# Patient Record
Sex: FEMALE | Race: WHITE | NOT HISPANIC OR LATINO | Employment: OTHER | URBAN - NONMETROPOLITAN AREA
[De-identification: names, ages, dates, MRNs, and addresses within clinical notes are randomized per-mention and may not be internally consistent; named-entity substitution may affect disease eponyms.]

---

## 2017-01-03 DIAGNOSIS — M1A.9XX1 CHRONIC GOUT WITH TOPHUS, UNSPECIFIED CAUSE, UNSPECIFIED SITE: Chronic | ICD-10-CM

## 2017-01-03 DIAGNOSIS — I10 ESSENTIAL HYPERTENSION: ICD-10-CM

## 2017-01-03 DIAGNOSIS — M10.9 GOUT, UNSPECIFIED CAUSE, UNSPECIFIED CHRONICITY, UNSPECIFIED SITE: ICD-10-CM

## 2017-01-03 DIAGNOSIS — E78.00 HYPERCHOLESTEROLEMIA: ICD-10-CM

## 2017-01-03 RX ORDER — LISINOPRIL AND HYDROCHLOROTHIAZIDE 25; 20 MG/1; MG/1
TABLET ORAL
Qty: 90 TAB | Refills: 0 | Status: SHIPPED | OUTPATIENT
Start: 2017-01-03 | End: 2017-03-22 | Stop reason: SDUPTHER

## 2017-01-03 RX ORDER — LOVASTATIN 10 MG/1
TABLET ORAL
Qty: 90 TAB | Refills: 0 | Status: SHIPPED | OUTPATIENT
Start: 2017-01-03 | End: 2017-03-22 | Stop reason: SDUPTHER

## 2017-03-08 ENCOUNTER — HOSPITAL ENCOUNTER (OUTPATIENT)
Dept: LAB | Facility: MEDICAL CENTER | Age: 67
End: 2017-03-08
Attending: INTERNAL MEDICINE
Payer: MEDICARE

## 2017-03-08 DIAGNOSIS — I10 ESSENTIAL HYPERTENSION: ICD-10-CM

## 2017-03-08 DIAGNOSIS — E55.9 VITAMIN D DEFICIENCY: ICD-10-CM

## 2017-03-08 LAB
25(OH)D3 SERPL-MCNC: 36 NG/ML (ref 30–100)
ALBUMIN SERPL BCP-MCNC: 3.6 G/DL (ref 3.2–4.9)
ALBUMIN/GLOB SERPL: 1.1 G/DL
ALP SERPL-CCNC: 100 U/L (ref 30–99)
ALT SERPL-CCNC: 15 U/L (ref 2–50)
ANION GAP SERPL CALC-SCNC: 11 MMOL/L (ref 0–11.9)
AST SERPL-CCNC: 21 U/L (ref 12–45)
BASOPHILS # BLD AUTO: 0.06 K/UL (ref 0–0.12)
BASOPHILS NFR BLD AUTO: 0.8 % (ref 0–1.8)
BILIRUB SERPL-MCNC: 0.6 MG/DL (ref 0.1–1.5)
BUN SERPL-MCNC: 29 MG/DL (ref 8–22)
CALCIUM SERPL-MCNC: 9.4 MG/DL (ref 8.5–10.5)
CHLORIDE SERPL-SCNC: 107 MMOL/L (ref 96–112)
CO2 SERPL-SCNC: 24 MMOL/L (ref 20–33)
CREAT SERPL-MCNC: 1.28 MG/DL (ref 0.5–1.4)
EOSINOPHIL # BLD: 0.46 K/UL (ref 0–0.51)
EOSINOPHIL NFR BLD AUTO: 6.5 % (ref 0–6.9)
ERYTHROCYTE [DISTWIDTH] IN BLOOD BY AUTOMATED COUNT: 45.8 FL (ref 35.9–50)
GLOBULIN SER CALC-MCNC: 3.2 G/DL (ref 1.9–3.5)
GLUCOSE SERPL-MCNC: 87 MG/DL (ref 65–99)
HCT VFR BLD AUTO: 41 % (ref 37–47)
HGB BLD-MCNC: 13.1 G/DL (ref 12–16)
IMM GRANULOCYTES # BLD AUTO: 0.02 K/UL (ref 0–0.11)
IMM GRANULOCYTES NFR BLD AUTO: 0.3 % (ref 0–0.9)
LYMPHOCYTES # BLD: 1.25 K/UL (ref 1–4.8)
LYMPHOCYTES NFR BLD AUTO: 17.6 % (ref 22–41)
MCH RBC QN AUTO: 28.4 PG (ref 27–33)
MCHC RBC AUTO-ENTMCNC: 32 G/DL (ref 33.6–35)
MCV RBC AUTO: 88.7 FL (ref 81.4–97.8)
MONOCYTES # BLD: 0.48 K/UL (ref 0–0.85)
MONOCYTES NFR BLD AUTO: 6.8 % (ref 0–13.4)
NEUTROPHILS # BLD: 4.84 K/UL (ref 2–7.15)
NEUTROPHILS NFR BLD AUTO: 68 % (ref 44–72)
NRBC # BLD AUTO: 0 K/UL
NRBC BLD-RTO: 0 /100 WBC
PLATELET # BLD AUTO: 216 K/UL (ref 164–446)
PMV BLD AUTO: 11.8 FL (ref 9–12.9)
POTASSIUM SERPL-SCNC: 3.9 MMOL/L (ref 3.6–5.5)
PROT SERPL-MCNC: 6.8 G/DL (ref 6–8.2)
RBC # BLD AUTO: 4.62 M/UL (ref 4.2–5.4)
SODIUM SERPL-SCNC: 142 MMOL/L (ref 135–145)
URATE SERPL-MCNC: 7.2 MG/DL (ref 1.9–8.2)
WBC # BLD AUTO: 7.1 K/UL (ref 4.8–10.8)

## 2017-03-08 PROCEDURE — 80053 COMPREHEN METABOLIC PANEL: CPT

## 2017-03-08 PROCEDURE — 36415 COLL VENOUS BLD VENIPUNCTURE: CPT

## 2017-03-08 PROCEDURE — 85025 COMPLETE CBC W/AUTO DIFF WBC: CPT

## 2017-03-08 PROCEDURE — 84550 ASSAY OF BLOOD/URIC ACID: CPT

## 2017-03-08 PROCEDURE — 82306 VITAMIN D 25 HYDROXY: CPT

## 2017-03-22 ENCOUNTER — OFFICE VISIT (OUTPATIENT)
Dept: MEDICAL GROUP | Facility: PHYSICIAN GROUP | Age: 67
End: 2017-03-22
Payer: MEDICARE

## 2017-03-22 VITALS
RESPIRATION RATE: 16 BRPM | DIASTOLIC BLOOD PRESSURE: 70 MMHG | SYSTOLIC BLOOD PRESSURE: 112 MMHG | HEIGHT: 60 IN | WEIGHT: 203 LBS | HEART RATE: 64 BPM | TEMPERATURE: 98.8 F | BODY MASS INDEX: 39.85 KG/M2 | OXYGEN SATURATION: 96 %

## 2017-03-22 DIAGNOSIS — L72.9 SCALP CYST: ICD-10-CM

## 2017-03-22 DIAGNOSIS — E78.00 HYPERCHOLESTEROLEMIA: ICD-10-CM

## 2017-03-22 DIAGNOSIS — R94.4 DECREASED GFR: ICD-10-CM

## 2017-03-22 DIAGNOSIS — I10 ESSENTIAL HYPERTENSION: ICD-10-CM

## 2017-03-22 DIAGNOSIS — M54.2 POSTERIOR NECK PAIN: ICD-10-CM

## 2017-03-22 DIAGNOSIS — M79.671 RIGHT FOOT PAIN: ICD-10-CM

## 2017-03-22 DIAGNOSIS — M10.9 GOUT, UNSPECIFIED CAUSE, UNSPECIFIED CHRONICITY, UNSPECIFIED SITE: ICD-10-CM

## 2017-03-22 PROCEDURE — 1101F PT FALLS ASSESS-DOCD LE1/YR: CPT | Performed by: NURSE PRACTITIONER

## 2017-03-22 PROCEDURE — G8432 DEP SCR NOT DOC, RNG: HCPCS | Performed by: NURSE PRACTITIONER

## 2017-03-22 PROCEDURE — 99214 OFFICE O/P EST MOD 30 MIN: CPT | Performed by: NURSE PRACTITIONER

## 2017-03-22 PROCEDURE — 1036F TOBACCO NON-USER: CPT | Performed by: NURSE PRACTITIONER

## 2017-03-22 PROCEDURE — 3014F SCREEN MAMMO DOC REV: CPT | Mod: 8P | Performed by: NURSE PRACTITIONER

## 2017-03-22 PROCEDURE — G8419 CALC BMI OUT NRM PARAM NOF/U: HCPCS | Performed by: NURSE PRACTITIONER

## 2017-03-22 PROCEDURE — G8484 FLU IMMUNIZE NO ADMIN: HCPCS | Performed by: NURSE PRACTITIONER

## 2017-03-22 PROCEDURE — 4040F PNEUMOC VAC/ADMIN/RCVD: CPT | Performed by: NURSE PRACTITIONER

## 2017-03-22 RX ORDER — ALLOPURINOL 100 MG/1
100 TABLET ORAL 2 TIMES DAILY
Qty: 180 TAB | Refills: 1 | Status: SHIPPED | OUTPATIENT
Start: 2017-03-22 | End: 2017-10-03 | Stop reason: SDUPTHER

## 2017-03-22 RX ORDER — LOVASTATIN 10 MG/1
10 TABLET ORAL DAILY
Qty: 90 TAB | Refills: 1 | Status: SHIPPED | OUTPATIENT
Start: 2017-03-22 | End: 2017-10-03 | Stop reason: SDUPTHER

## 2017-03-22 RX ORDER — LISINOPRIL AND HYDROCHLOROTHIAZIDE 25; 20 MG/1; MG/1
1 TABLET ORAL DAILY
Qty: 90 TAB | Refills: 1 | Status: SHIPPED | OUTPATIENT
Start: 2017-03-22 | End: 2017-10-03 | Stop reason: SDUPTHER

## 2017-03-22 ASSESSMENT — PATIENT HEALTH QUESTIONNAIRE - PHQ9: CLINICAL INTERPRETATION OF PHQ2 SCORE: 1

## 2017-03-22 NOTE — ASSESSMENT & PLAN NOTE
This is a chronic condition which is well controlled on medications. Patient is tolerating medications without side effects.

## 2017-03-22 NOTE — MR AVS SNAPSHOT
Divya Morales   3/22/2017 11:40 AM   Office Visit   MRN: [id number]    Department:  [facility]   Dept Phone:  [phone]    Description:  Female : 1950   Provider:  BERNICE Villela           Reason for Visit     Follow-Up labs      Allergies as of 3/22/2017     Allergen Noted Reactions    Percocet [Apap-Fd&C Red #40 [place]-Oxycodone] 09/15/2016   Vomiting    CHILLS, VOMITTING    Tramadol 2016   Vomiting      You were diagnosed with     Right foot pain   [723136]       Posterior neck pain   [014291]       Scalp cyst   [425850]       Essential hypertension   [3966326]       Hypercholesterolemia   [412495]       Gout, unspecified cause, unspecified chronicity, unspecified site   [4596691]       Decreased GFR   [902802]         Vital Signs     Blood Pressure Pulse Temperature Respirations Height Weight    112/70 mmHg 64 37.1 °C (98.8 °F) 16 1.524 m (5') 92.08 kg (203 lb)    Body Mass Index Oxygen Saturation Breastfeeding? Smoking Status          39.65 kg/m2 96% No Never Smoker         Basic Information     Date Of Birth Sex Race Ethnicity Preferred Language    1950 Female White Non- English      Your appointments     Sep 15, 2017 11:00 AM   Established Patient with Tabatha YOUSSEF M.D.   [facility] (--)    [address] [facility] [id number] [phone]           You will be receiving a confirmation call a few days before your appointment from our automated call confirmation system.              Problem List              ICD-10-CM Priority Class Noted - Resolved    Hypertension I10   Unknown - Present    Arthritis M19.90   Unknown - Present    Gout, chronic (Chronic) M1A.9XX0   2013 - Present    Hypercholesterolemia E78.00   8/15/2013 - Present    Decreased GFR R94.4   2013 - Present    Chronic pain of left knee M25.562, G89.29   2014 - Present    Obesity (BMI 35.0-39.9 without comorbidity) ([facility]) E66.9    2/25/2015 - Present    Dental caries K02.9   4/22/2015 - Present    CHF with left ventricular diastolic dysfunction, NYHA class 1 (CMS-[facility]) I50.30   6/14/2015 - Present    Localized primary osteoarthritis of left lower leg M17.12   3/14/2016 - Present    Localized primary osteoarthritis of lower leg M17.10   3/14/2016 - Present    Iron deficiency anemia due to chronic blood loss D50.0   6/7/2016 - Present    Obesity E66.9   6/7/2016 - Present    Scalp cyst L72.9   9/15/2016 - Present    Right foot pain M79.671   3/22/2017 - Present    Posterior neck pain M54.2   3/22/2017 - Present      Health Maintenance        Date Due Completion Dates    IMM DTaP/Tdap/Td Vaccine (1 - Tdap) 6/18/1967 ---    PAP SMEAR 6/18/1969 ---    IMM ZOSTER VACCINE 6/18/2008 ---    BONE DENSITY 6/18/2013 ---    MAMMOGRAM 9/4/2015 9/4/2014    IMM PNEUMOCOCCAL 65+ (ADULT) LOW/MEDIUM RISK SERIES (2 of 2 - PCV13) 5/21/2016 5/21/2015    IMM INFLUENZA (1) 9/1/2016 10/14/2015    COLONOSCOPY 8/29/2024 8/29/2014 (Declined)    Override on 8/29/2014: Patient Declined            Current Immunizations     Influenza Vaccine Adult HD 10/14/2015    Pneumococcal polysaccharide vaccine (PPSV-23) 5/21/2015      Below and/or attached are the medications your provider expects you to take. Review all of your home medications and newly ordered medications with your provider and/or pharmacist. Follow medication instructions as directed by your provider and/or pharmacist. Please keep your medication list with you and share with your provider. Update the information when medications are discontinued, doses are changed, or new medications (including over-the-counter products) are added; and carry medication information at all times in the event of emergency situations     Allergies:  PERCOCET - Vomiting     TRAMADOL - Vomiting               Medications  Valid as of: March 22, 2017 -  1:38 PM    Generic Name Brand Name Tablet Size Instructions for use    Allopurinol  (Tab) ZYLOPRIM 100 MG Take 1 Tab by mouth 2 times a day.        Aspirin (Tablet Delayed Response) ECOTRIN 81 MG Take 81 mg by mouth every day.        Cholecalciferol (Cap) Cholecalciferol 2000 UNIT Take 1 Cap by mouth every bedtime. For vitamin D deficiency        Ibuprofen (Tab) MOTRIN 800 MG Take 1 Tab by mouth 2 times a day as needed.        Lisinopril-Hydrochlorothiazide (Tab) PRINZIDE, ZESTORETIC 20-25 MG Take 1 Tab by mouth every day.        Lovastatin (Tab) MEVACOR 10 MG Take 1 Tab by mouth every day.        Multiple Vitamins-Minerals   Take  by mouth.        .                 Medicines prescribed today were sent to:     [facility] [address] [place] [address] - [address] [place] [address]    Phone: [phone] Fax: [phone]    Open 24 Hours?: No    [facility] [address] [facility]    [address] [facility] [address]    Phone: [phone] Fax: [phone]    Open 24 Hours?: No      Medication refill instructions:       If your prescription bottle indicates you have medication refills left, it is not necessary to call your provider’s office. Please contact your pharmacy and they will refill your medication.    If your prescription bottle indicates you do not have any refills left, you may request refills at any time through one of the following ways: The online [employer] system (except Urgent Care), by calling your provider’s office, or by asking your pharmacy to contact your provider’s office with a refill request. Medication refills are processed only during regular business hours and may not be available until the next business day. Your provider may request additional information or to have a follow-up visit with you prior to refilling your medication.   *Please Note: Medication refills are assigned a new Rx number when refilled electronically. Your pharmacy may indicate that no refills were authorized even though a new prescription for  the same medication is available at the pharmacy. Please request the medicine by name with the pharmacy before contacting your provider for a refill.           MyChart Access Code: Activation code not generated  Current MyChart Status: Active

## 2017-03-22 NOTE — ASSESSMENT & PLAN NOTE
Excision 2 months ago, healing well. Scabbed over, occasional clear drainage from the site. No redness, warmth to surgical site, no fevers.

## 2017-03-22 NOTE — ASSESSMENT & PLAN NOTE
"Patient reports posterior neck pain that is intermittent x 3+ months. She finds that the pain is worse when she looks down at her phone \"to play my games\" an activity that she does daily for extended periods of time, she does admit to being very sedentary. She has poor posture. She reports that she was told many years ago that she had a slipped disc, she did not have follow up. She denies radicular symptoms, pain in UE or loss of fine motor function. NSAID does help the pain.   "

## 2017-03-22 NOTE — ASSESSMENT & PLAN NOTE
Ongoing, stable. No change to GFR in 6 months. She continues to use NSAID 2-3 times a week. Cr, potassium, BUN are normal. HTN is well controlled on medication to include ACE-I. Push fluids. Patient is not diabetic. Monitor BNP Q 6 months.

## 2017-03-22 NOTE — ASSESSMENT & PLAN NOTE
Patient reports right 3rd toe pain that has been intermittent and ongoing for 1 month. She does find that walking with shoes on worsens the pain occasionally. NSAID relieves the pain. No erythema or TTTP. She continues to take allopurinol daily for gout, this doesn't feel like the pain she has had with gout flare in the past, her uric acid level was normal.

## 2017-03-22 NOTE — PROGRESS NOTES
"[facility]  Primary Care Office Visit - Problem-Oriented        History:     Divya Morales is a 66 y.o. female who is here today to discuss Follow-Up      Right foot pain  Patient reports right 3rd toe pain that has been intermittent and ongoing for 1 month. She does find that walking with shoes on worsens the pain occasionally. NSAID relieves the pain. No erythema or TTTP. She continues to take allopurinol daily for gout, this doesn't feel like the pain she has had with gout flare in the past, her uric acid level was normal.          Scalp cyst  Excision 2 months ago, healing well. Scabbed over, occasional clear drainage from the site. No redness, warmth to surgical site, no fevers.     Hypertension  This is a chronic condition which is well controlled on medications. Patient is tolerating medications without side effects.      Hypercholesterolemia  This is a chronic condition which is well controlled on medications. Patient is tolerating medications without side effects.      Decreased GFR  Ongoing, stable. No change to GFR in 6 months. She continues to use NSAID 2-3 times a week. Cr, potassium, BUN are normal. HTN is well controlled on medication to include ACE-I. Push fluids. Patient is not diabetic. Monitor BNP Q 6 months.     Posterior neck pain  Patient reports posterior neck pain that is intermittent x 3+ months. She finds that the pain is worse when she looks down at her phone \"to play my games\" an activity that she does daily for extended periods of time, she does admit to being very sedentary. She has poor posture. She reports that she was told many years ago that she had a slipped disc, she did not have follow up. She denies radicular symptoms, pain in UE or loss of fine motor function. NSAID does help the pain.           Past Medical History   Diagnosis Date   • Hypertension    • GOUT 7/16/2013   • Arthritis      back, knee   • Hypercholesterolemia 8/15/2013   • Decreased GFR " 9/25/2013   • Obesity (BMI 35.0-39.9 without comorbidity) 2/25/2015   • Cellulitis of hand 4/22/2015   • High cholesterol    • Pain      knees   • Cold 2/17/2016     stomach flu   • Congestive heart failure    • Dental disorder      missing teeth   • Iron deficiency anemia due to chronic blood loss 6/7/2016   • Obesity 6/7/2016   • Scalp cyst 9/15/2016     Past Surgical History   Procedure Laterality Date   • Tubal coagulation laparoscopic bilateral  1979   • Appendectomy  1979     with tubal ligation   • Tonsillectomy  1969   • Knee arthroplasty total Right 3/14/2016     Procedure: KNEE ARTHROPLASTY TOTAL;  Surgeon: Dain See M.D.;  [facility];  Service:      Social History     Social History   • Marital Status:      Spouse Name: N/A   • Number of Children: N/A   • Years of Education: N/A     Occupational History   • Not on file.     Social History Main Topics   • Smoking status: Never Smoker    • Smokeless tobacco: Never Used   • Alcohol Use: No      Comment: quit 12 yrs ago, before that drank heavily   • Drug Use: No   • Sexual Activity: Not Currently      Comment:      Other Topics Concern   • Not on file     Social History Narrative    , retired [employer]     History   Smoking status   • Never Smoker    Smokeless tobacco   • Never Used     Family History   Problem Relation Age of Onset   • Heart Disease Mother    • Heart Disease Father    • Diabetes Father    • Hypertension       Allergies   Allergen Reactions   • Percocet [Apap-Fd&C Red #40 [place]-Oxycodone] Vomiting     CHILLS, VOMITTING   • Tramadol Vomiting       Problem List:     Patient Active Problem List    Diagnosis Date Noted   • Right foot pain 03/22/2017   • Scalp cyst 09/15/2016   • Iron deficiency anemia due to chronic blood loss 06/07/2016   • Obesity 06/07/2016   • Localized primary osteoarthritis of left lower leg 03/14/2016   • Localized primary osteoarthritis of lower leg 03/14/2016   • CHF  with left ventricular diastolic dysfunction, NYHA class 1 (CMS-[facility]) 06/14/2015   • Dental caries 04/22/2015   • Obesity (BMI 35.0-39.9 without comorbidity) ([facility]) 02/25/2015   • Chronic pain of left knee 08/29/2014   • Decreased GFR 09/25/2013   • Hypercholesterolemia 08/15/2013   • Gout, chronic 07/16/2013   • Hypertension    • Arthritis          Medications:     Current outpatient prescriptions:   •  lisinopril-hydrochlorothiazide (PRINZIDE, ZESTORETIC) 20-25 MG per tablet, Take 1 Tab by mouth every day., Disp: 90 Tab, Rfl: 1  •  lovastatin (MEVACOR) 10 MG tablet, Take 1 Tab by mouth every day., Disp: 90 Tab, Rfl: 1  •  allopurinol (ZYLOPRIM) 100 MG Tab, Take 1 Tab by mouth 2 times a day., Disp: 180 Tab, Rfl: 1  •  Cholecalciferol ([employer] VITAMIN D) 2000 UNIT Cap, Take 1 Cap by mouth every bedtime. For vitamin D deficiency, Disp: 90 Cap, Rfl: 3  •  Multiple Vitamins-Minerals (MULTIVITAMIN PO), Take  by mouth., Disp: , Rfl:   •  aspirin EC (ECOTRIN) 81 MG TBEC, Take 81 mg by mouth every day., Disp: , Rfl:   •  ibuprofen (MOTRIN) 800 MG Tab, Take 1 Tab by mouth 2 times a day as needed., Disp: 60 Tab, Rfl: 1      Review of Systems:     Positives per HPI, all other systems reviewed and WNL       Physical Assessment:     VS: /70 mmHg  Pulse 64  Temp(Src) 37.1 °C (98.8 °F)  Resp 16  Ht 1.524 m (5')  Wt 92.08 kg (203 lb)  BMI 39.65 kg/m2  SpO2 96%  Breastfeeding? No    General: Well-developed, well-nourished female, obese body habitus     Head: PERRL, EOMI. Normocephalic. No facial asymmetry noted.  Neck: upper traps with increased tone, decreased ROM in all planes, no pain with ROM. Spurlings negative bilat. Sensation and strength equal BUE. Neck supple, no thyromegaly  Cardiovasc:No JVD.  RRR, no MRG. No thrills or bruits. Pulses 2+ and symmetric at all distal extremities.  Pulmonary: Lungs clear bilaterally.  Normal respiratory effort. No wheeze or crackles.   Extremities: No edema, no TTP bilateral calves.  Pedal pulses intact. No joint effusions. LEs warm and well-perfused. Right foot exam unremarkable.   Neuro: Alert and oriented  Skin: excision site with scab, no drainage or erythema, NTTP. No rashes noted. Skin warm, dry, intact    Psych: Dressed appropriately for the weather, pleasant and conversant.  Affect, mood & judgment appropriate.      Assessment/Plan:   [place] was seen today for follow-up.    Diagnoses and all orders for this visit:    Right foot pain, new   - reassurance, OA vs tendonitis, may continue to use NSAID sparingly, if pain persists or worsens will move forward with XRAY.     Posterior neck pain, new   - PE reassuring, cervical strain, discussed importance of good posture, ROM exercises, massage, cervical extension with towel or roll daily, raising items to eye height to avoid looking down for prolonged periods. NSAID sparingly. Follow up if sx worsen or persist, will move forward with XRAY.     Scalp cyst, excision site healing well     Essential hypertension, chronic, controlled   - continue current treatment, follow up in 6 months   -     lisinopril-hydrochlorothiazide (PRINZIDE, ZESTORETIC) 20-25 MG per tablet; Take 1 Tab by mouth every day.    Hypercholesterolemia, chronic, controlled   - continue current treatment, follow up in 6 months   -     lovastatin (MEVACOR) 10 MG tablet; Take 1 Tab by mouth every day.    Gout, unspecified cause, unspecified chronicity, unspecified site, chronic, controlled   - continue current treatment, follow up in 6 months   -     allopurinol (ZYLOPRIM) 100 MG Tab; Take 1 Tab by mouth 2 times a day.    Decreased GFR, stable   - discussed avoiding regular/daily use of NSAID, push fluids. BP well controlled on ACE-I. Monitor BMP q 6 months.     Patient is agreeable to the above plan and voiced understanding. All questions answered.     Please note that this dictation was created using voice recognition software. I have made every reasonable attempt to correct  obvious errors, but I expect that there are errors of grammar and possibly content that I did not discover before finalizing the note.      RUDY Ordoñez  3/22/2017, 1:22 PM

## 2017-06-13 ENCOUNTER — PATIENT OUTREACH (OUTPATIENT)
Dept: HEALTH INFORMATION MANAGEMENT | Facility: OTHER | Age: 67
End: 2017-06-13

## 2017-06-13 NOTE — PROGRESS NOTES
6/13/17  -  Outcome: Left Message with [facility] [employer]    WebIZ Checked & Epic Updated:  yes    [facility] Verified: mcr    Attempt # 1

## 2017-09-15 ENCOUNTER — OFFICE VISIT (OUTPATIENT)
Dept: MEDICAL GROUP | Facility: PHYSICIAN GROUP | Age: 67
End: 2017-09-15
Payer: MEDICARE

## 2017-09-15 VITALS
HEIGHT: 60 IN | DIASTOLIC BLOOD PRESSURE: 80 MMHG | WEIGHT: 210 LBS | HEART RATE: 66 BPM | OXYGEN SATURATION: 96 % | RESPIRATION RATE: 20 BRPM | BODY MASS INDEX: 41.23 KG/M2 | SYSTOLIC BLOOD PRESSURE: 136 MMHG | TEMPERATURE: 97.3 F

## 2017-09-15 DIAGNOSIS — K02.9 DENTAL CARIES: ICD-10-CM

## 2017-09-15 DIAGNOSIS — F34.1 DYSTHYMIA: ICD-10-CM

## 2017-09-15 DIAGNOSIS — Z23 NEED FOR VACCINATION WITH 13-POLYVALENT PNEUMOCOCCAL CONJUGATE VACCINE: ICD-10-CM

## 2017-09-15 DIAGNOSIS — Z23 NEED FOR IMMUNIZATION AGAINST INFLUENZA: ICD-10-CM

## 2017-09-15 DIAGNOSIS — E66.9 OBESITY (BMI 35.0-39.9 WITHOUT COMORBIDITY): ICD-10-CM

## 2017-09-15 DIAGNOSIS — M54.2 POSTERIOR NECK PAIN: ICD-10-CM

## 2017-09-15 DIAGNOSIS — I50.30 CHF WITH LEFT VENTRICULAR DIASTOLIC DYSFUNCTION, NYHA CLASS 1 (HCC): ICD-10-CM

## 2017-09-15 PROCEDURE — 90662 IIV NO PRSV INCREASED AG IM: CPT | Performed by: INTERNAL MEDICINE

## 2017-09-15 PROCEDURE — 90670 PCV13 VACCINE IM: CPT | Performed by: INTERNAL MEDICINE

## 2017-09-15 PROCEDURE — G0009 ADMIN PNEUMOCOCCAL VACCINE: HCPCS | Performed by: INTERNAL MEDICINE

## 2017-09-15 PROCEDURE — 99214 OFFICE O/P EST MOD 30 MIN: CPT | Mod: 25 | Performed by: INTERNAL MEDICINE

## 2017-09-15 PROCEDURE — G0008 ADMIN INFLUENZA VIRUS VAC: HCPCS | Performed by: INTERNAL MEDICINE

## 2017-09-15 RX ORDER — ESCITALOPRAM OXALATE 10 MG/1
10 TABLET ORAL DAILY
Qty: 30 TAB | Refills: 5 | Status: SHIPPED | OUTPATIENT
Start: 2017-09-15 | End: 2018-03-10 | Stop reason: SDUPTHER

## 2017-09-15 ASSESSMENT — PAIN SCALES - GENERAL: PAINLEVEL: 5=MODERATE PAIN

## 2017-09-15 NOTE — PROGRESS NOTES
Chief Complaint   Patient presents with   • Neck Pain     neck pain x3 months    • Other     dry spots on face, mole on left arm    • Immunizations     pneumo 13, TDAP       HISTORY OF PRESENT ILLNESS: Patient is a 67 y.o. female established patient who presents today to discuss the medical issues below.    Posterior neck pain  Patient continues to complain of the neck pain, she feels this is gradually getting worse. Patient takes some ibuprofen about 2 x a week. Helps a bit.  Not exercising.  pjatient states no radiation into th upper extremities.  Not doing any true neck or shoulder x rays.    Dental caries  Patient continues with dental caries.  She reports she had an abcess about 1 mo ago, antibiotics at the [place] [facility].  Not clear if the patient has medicaid or not.      CHF with left ventricular diastolic dysfunction, NYHA class 1 (CMS-HCC)  sestamibi stress test neg 2015.  Patient reports some chest tightness and sob no radiation or specific pain, she was under a lot of stress with tooth abscess.      Obesity (BMI 35.0-39.9 without comorbidity) ([facility])  Patient not exercising, weight is up.  She has lots of reasons for this.      Dysthymia  Patient tearfully today after discussion of problems and some lack of motivation.       Patient Active Problem List    Diagnosis Date Noted   • CHF with left ventricular diastolic dysfunction, NYHA class 1 (CMS-HCC) 06/14/2015     Priority: High   • Decreased GFR 09/25/2013     Priority: High   • Posterior neck pain 03/22/2017     Priority: Medium   • Obesity (BMI 35.0-39.9 without comorbidity) ([facility]) 02/25/2015     Priority: Medium   • Hypercholesterolemia 08/15/2013     Priority: Medium   • Hypertension      Priority: Medium   • Dysthymia 09/15/2017   • Right foot pain 03/22/2017   • Scalp cyst 09/15/2016   • Iron deficiency anemia due to chronic blood loss 06/07/2016   • Localized primary osteoarthritis of left lower leg 03/14/2016   • Localized primary osteoarthritis of  lower leg 2016   • Dental caries 2015   • Chronic pain of left knee 2014   • Gout, chronic 2013   • Arthritis        Allergies:Amoxicillin; Percocet [apap-fd&c red #40 [place]-oxycodone]; and Tramadol    Current Outpatient Prescriptions   Medication Sig Dispense Refill   • escitalopram (LEXAPRO) 10 MG Tab Take 1 Tab by mouth every day. 30 Tab 5   • lisinopril-hydrochlorothiazide (PRINZIDE, ZESTORETIC) 20-25 MG per tablet Take 1 Tab by mouth every day. 90 Tab 1   • lovastatin (MEVACOR) 10 MG tablet Take 1 Tab by mouth every day. 90 Tab 1   • allopurinol (ZYLOPRIM) 100 MG Tab Take 1 Tab by mouth 2 times a day. 180 Tab 1   • Cholecalciferol ([employer]S VITAMIN D) 2000 UNIT Cap Take 1 Cap by mouth every bedtime. For vitamin D deficiency 90 Cap 3   • Multiple Vitamins-Minerals (MULTIVITAMIN PO) Take  by mouth.     • aspirin EC (ECOTRIN) 81 MG TBEC Take 81 mg by mouth every day.     • ibuprofen (MOTRIN) 800 MG Tab Take 1 Tab by mouth 2 times a day as needed. 60 Tab 1     No current facility-administered medications for this visit.          Past Medical History:   Diagnosis Date   • Scalp cyst 9/15/2016   • Iron deficiency anemia due to chronic blood loss 2016   • Obesity 2016   • Cold 2016    stomach flu   • Cellulitis of hand 2015   • Obesity (BMI 35.0-39.9 without comorbidity) ([facility]) 2015   • Decreased GFR 2013   • Hypercholesterolemia 8/15/2013   • GOUT 2013   • Arthritis     back, knee   • Congestive heart failure (CMS-[facility])    • Dental disorder     missing teeth   • High cholesterol    • Hypertension    • Pain     knees       Social History   Substance Use Topics   • Smoking status: Never Smoker   • Smokeless tobacco: Never Used   • Alcohol use No      Comment: quit 12 yrs ago, before that drank heavily       Family Status   Relation Status   • Mother Alive   • Father    •       Family History   Problem Relation Age of Onset   • Heart Disease Mother    • Heart  Disease Father    • Diabetes Father    • Hypertension         ROS:    Respiratory: Negative for cough, sputum production, shortness of breath or wheezing.    Cardiovascular: Negative for chest pain, palpitations, orthopnea, dyspnea with exertion or edema.   Gastrointestinal: Negative for GI upset, nausea, vomiting, abdominal pain, constipation or diarrhea.   Genitourinary: Negative for dysuria, urgency, hesitancy or frequency.       Exam:    Blood pressure 136/80, pulse 66, temperature 36.3 °C (97.3 °F), resp. rate 20, height 1.524 m (5'), weight 95.3 kg (210 lb), SpO2 96 %.  General:  Well nourished, well developed female in NAD.  HEENT: Teeth in very poor repair  Skin: Diffuse hyperkeratosis  Pulmonary: Clear to ausculation and percussion.  Normal effort. No rales, rhonchi, or wheezing.  Cardiovascular: Regular rate and rhythm without murmur.   Abdomen: Normal bowel sounds soft and nontender no palpable liver spleen bladder mass.  Extremities: No LE edema noted.  Neuro: Grossly nonfocal.  Psych: Alert and oriented to person, place, and time. Initially very  jovial subsequently tearful        This dictation was created using voice recognition software. I have made reasonable attempts to correct errors, however, errors of grammar and content may exist.          Assessment/Plan:    1. Posterior neck pain  Patient's daughter is here with her today which gives us an opportunity to review previous recommendations for neck pain management and lack of progression. At this point daughter feels that chiropractic would be the most appropriate. Back and neck exercises handout given. Recommendation for x-ray, early follow-up if not progressing would need referral  - DX-CERVICAL SPINE-2 OR 3 VIEWS; Future    2. Dental caries  Reviewed with daughter and patient recommendation to have the significant degree of caries addressed. Daughter is not certain whether patient has Medicaid or not. Discussed option for Medicaid dentistry in  [place] however need to progresses emphasized.    3. CHF with left ventricular diastolic dysfunction, NYHA class 1 (CMS-[facility])  Clinically compensated continuing on medications    4. Need for immunization against influenza    - INFLUENZA VACCINE, HIGH DOSE (65+ ONLY)    5. Need for vaccination with 13-polyvalent pneumococcal conjugate vaccine    - PNEUMOCOCCAL CONJUGATE VACCINE 13-VALENT    6. Obesity (BMI 35.0-39.9 without comorbidity) ([facility])  Reviewed diet and exercise weight loss. She somewhat limited in her exercise program. Limited insight in diet. Daughter is encouraged to assist support monitor    7. Dysthymia  Patient initially joking and laughing however with directly addressing my concern about her lack of progression through therapy x-rays etc. she does become tearful with metastases to being somewhat overwhelmed. Long discussion held with the daughter and the patient. At this point increased family support and start low-dose antidepressant is Lexapro 10 mg by mouth daily.    Patient was seen for  25 minutes face to face of which more than 50% of the time was spent in counseling and coordination of care regarding the above problems.

## 2017-10-03 DIAGNOSIS — M10.9 GOUT, UNSPECIFIED CAUSE, UNSPECIFIED CHRONICITY, UNSPECIFIED SITE: ICD-10-CM

## 2017-10-03 DIAGNOSIS — I10 ESSENTIAL HYPERTENSION: ICD-10-CM

## 2017-10-03 DIAGNOSIS — E78.00 HYPERCHOLESTEROLEMIA: ICD-10-CM

## 2017-10-05 RX ORDER — ALLOPURINOL 100 MG/1
TABLET ORAL
Qty: 180 TAB | Refills: 0 | Status: SHIPPED | OUTPATIENT
Start: 2017-10-05 | End: 2018-01-16 | Stop reason: SDUPTHER

## 2017-10-05 RX ORDER — LISINOPRIL AND HYDROCHLOROTHIAZIDE 25; 20 MG/1; MG/1
TABLET ORAL
Qty: 90 TAB | Refills: 0 | Status: SHIPPED | OUTPATIENT
Start: 2017-10-05 | End: 2018-01-16 | Stop reason: SDUPTHER

## 2017-10-05 RX ORDER — LOVASTATIN 10 MG/1
TABLET ORAL
Qty: 90 TAB | Refills: 0 | Status: SHIPPED | OUTPATIENT
Start: 2017-10-05 | End: 2018-01-16 | Stop reason: SDUPTHER

## 2017-10-05 NOTE — TELEPHONE ENCOUNTER
Was the patient seen in the last year in this department? Yes     Does patient have an active prescription for medications requested? No     Received Request Via: Pharmacy      Pt met protocol?: No pt last ov 9/2017  Is due for lipid labs  BP Readings from Last 1 Encounters:   09/15/17 136/80     Uric Acid   Date Value Ref Range Status   03/08/2017 7.2 1.9 - 8.2 mg/dL Final     Cholesterol,Tot   Date Value Ref Range Status   09/13/2016 167 100 - 199 mg/dL Final     LDL   Date Value Ref Range Status   09/13/2016 97 <100 mg/dL Final     HDL   Date Value Ref Range Status   09/13/2016 46 >=40 mg/dL Final

## 2017-11-10 ENCOUNTER — APPOINTMENT (OUTPATIENT)
Dept: RADIOLOGY | Facility: IMAGING CENTER | Age: 67
End: 2017-11-10
Attending: INTERNAL MEDICINE
Payer: MEDICARE

## 2017-11-10 ENCOUNTER — APPOINTMENT (OUTPATIENT)
Dept: URGENT CARE | Facility: PHYSICIAN GROUP | Age: 67
End: 2017-11-10
Payer: MEDICARE

## 2017-11-10 DIAGNOSIS — M54.2 POSTERIOR NECK PAIN: ICD-10-CM

## 2017-11-10 PROCEDURE — 72040 X-RAY EXAM NECK SPINE 2-3 VW: CPT | Mod: TC | Performed by: FAMILY MEDICINE

## 2017-11-22 ENCOUNTER — OFFICE VISIT (OUTPATIENT)
Dept: MEDICAL GROUP | Facility: PHYSICIAN GROUP | Age: 67
End: 2017-11-22
Payer: MEDICARE

## 2017-11-22 VITALS
RESPIRATION RATE: 16 BRPM | HEIGHT: 60 IN | SYSTOLIC BLOOD PRESSURE: 110 MMHG | OXYGEN SATURATION: 95 % | DIASTOLIC BLOOD PRESSURE: 68 MMHG | HEART RATE: 62 BPM | WEIGHT: 204 LBS | BODY MASS INDEX: 40.05 KG/M2 | TEMPERATURE: 97.1 F

## 2017-11-22 DIAGNOSIS — F34.1 DYSTHYMIA: ICD-10-CM

## 2017-11-22 DIAGNOSIS — E66.9 OBESITY (BMI 35.0-39.9 WITHOUT COMORBIDITY): ICD-10-CM

## 2017-11-22 DIAGNOSIS — M54.2 POSTERIOR NECK PAIN: ICD-10-CM

## 2017-11-22 DIAGNOSIS — K02.9 DENTAL CARIES: ICD-10-CM

## 2017-11-22 PROCEDURE — 99214 OFFICE O/P EST MOD 30 MIN: CPT | Performed by: INTERNAL MEDICINE

## 2017-11-22 ASSESSMENT — PAIN SCALES - GENERAL: PAINLEVEL: 5=MODERATE PAIN

## 2017-11-22 NOTE — ASSESSMENT & PLAN NOTE
patient had xray done.  States she did not go for the physical therapy, she states still a problem.  She is using the ibuprofen only [prn and states about 1-2 x a week.  Daughter is not here for OV.  Pain described as aching present most of the time, seems a bit worse.  She states sometimes radiates up into her skull.  No UE numbness tingling or weakness.  She feels more stiff and turns her body instead of the neck.  She states she did not go to the chiropractor and is unable to tell me why.

## 2017-11-22 NOTE — PATIENT INSTRUCTIONS
Consider referral to the neck specialist  Chiropractic is an option to try 1 x a mo when coming to town for shopping    Consider Ibuprofen 1 at evening with supper if neck hurting to sleep    Heat, stretching exercise.    Consider trial naproxen instead of motrin.

## 2017-11-22 NOTE — PROGRESS NOTES
Chief Complaint   Patient presents with   • Neck Pain     x ray results        HISTORY OF PRESENT ILLNESS: Patient is a 67 y.o. female established patient who presents today to discuss the medical issues below.    Posterior neck pain  patient had xray done.  States she did not go for the physical therapy, she states still a problem.  She is using the ibuprofen only [prn and states about 1-2 x a week.  Daughter is not here for OV.  Pain described as aching present most of the time, seems a bit worse.  She states sometimes radiates up into her skull.  No UE numbness tingling or weakness.  She feels more stiff and turns her body instead of the neck.  She states she did not go to the chiropractor and is unable to tell me why.      Dysthymia  Patient reports an episode of dehydration last week, felt weak, fell at home with position change, was seen at [facility], we do not have records, per patient dx as dehydration.  She admits to having not done anything previously discussed other than babysitting.    Dental caries  Financially limited, no progress on this issue.      Obesity (BMI 35.0-39.9 without comorbidity) ([facility])  Weight is down a bit but she doesn't identify as having changed her diet.        Patient Active Problem List    Diagnosis Date Noted   • CHF with left ventricular diastolic dysfunction, NYHA class 1 (CMS-[facility]) 06/14/2015     Priority: High   • Decreased GFR 09/25/2013     Priority: High   • Posterior neck pain 03/22/2017     Priority: Medium   • Obesity (BMI 35.0-39.9 without comorbidity) ([facility]) 02/25/2015     Priority: Medium   • Hypercholesterolemia 08/15/2013     Priority: Medium   • Hypertension      Priority: Medium   • Dysthymia 09/15/2017   • Right foot pain 03/22/2017   • Scalp cyst 09/15/2016   • Iron deficiency anemia due to chronic blood loss 06/07/2016   • Localized primary osteoarthritis of left lower leg 03/14/2016   • Localized primary osteoarthritis of lower leg 03/14/2016   • Dental caries  2015   • Chronic pain of left knee 2014   • Gout, chronic 2013   • Arthritis        Allergies:Amoxicillin; Percocet [apap-fd&c red #40 [place]-oxycodone]; and Tramadol    Current Outpatient Prescriptions   Medication Sig Dispense Refill   • lovastatin (MEVACOR) 10 MG tablet TAKE ONE TABLET BY MOUTH ONCE DAILY 90 Tab 0   • allopurinol (ZYLOPRIM) 100 MG Tab TAKE ONE TABLET BY MOUTH TWICE DAILY 180 Tab 0   • lisinopril-hydrochlorothiazide (PRINZIDE, ZESTORETIC) 20-25 MG per tablet TAKE ONE TABLET BY MOUTH ONCE DAILY 90 Tab 0   • escitalopram (LEXAPRO) 10 MG Tab Take 1 Tab by mouth every day. 30 Tab 5   • Cholecalciferol ([employer] VITAMIN D) 2000 UNIT Cap Take 1 Cap by mouth every bedtime. For vitamin D deficiency 90 Cap 3   • Multiple Vitamins-Minerals (MULTIVITAMIN PO) Take  by mouth.     • aspirin EC (ECOTRIN) 81 MG TBEC Take 81 mg by mouth every day.     • ibuprofen (MOTRIN) 800 MG Tab Take 1 Tab by mouth 2 times a day as needed. 60 Tab 1     No current facility-administered medications for this visit.          Past Medical History:   Diagnosis Date   • Arthritis     back, knee   • Cellulitis of hand 2015   • Cold 2016    stomach flu   • Congestive heart failure (CMS-[facility])    • Decreased GFR 2013   • Dental disorder     missing teeth   • GOUT 2013   • High cholesterol    • Hypercholesterolemia 8/15/2013   • Hypertension    • Iron deficiency anemia due to chronic blood loss 2016   • Obesity 2016   • Obesity (BMI 35.0-39.9 without comorbidity) 2015   • Pain     knees   • Scalp cyst 9/15/2016       Social History   Substance Use Topics   • Smoking status: Never Smoker   • Smokeless tobacco: Never Used   • Alcohol use No      Comment: quit 12 yrs ago, before that drank heavily       Family Status   Relation Status   • Mother Alive   • Father    •       Family History   Problem Relation Age of Onset   • Heart Disease Mother    • Heart Disease Father    • Diabetes  Father    • Hypertension         ROS:    Respiratory: Negative for cough, sputum production, shortness of breath or wheezing.    Cardiovascular: Negative for chest pain, palpitations, orthopnea, dyspnea with exertion or edema.   Gastrointestinal: Negative for GI upset, nausea, vomiting, abdominal pain, constipation or diarrhea.   Genitourinary: Negative for dysuria, urgency, hesitancy or frequency.       Exam:    Blood pressure 110/68, pulse 62, temperature 36.2 °C (97.1 °F), resp. rate 16, height 1.524 m (5'), weight 92.5 kg (204 lb), SpO2 95 %.  General:  Well nourished, well developed female in NAD.  Spine: Diffuse tenderness at the cervical spine no true point tenderness.  Pulmonary: Clear to ausculation and percussion.  Normal effort. No rales, rhonchi, or wheezing.  Cardiovascular: Regular rate and rhythm without murmur.   Abdomen: Normal bowel sounds soft and nontender no palpable liver spleen bladder mass.  Extremities bilateral upper extremity was no weakness, normal DTRs  Neuro: Grossly nonfocal.  Psych: Alert and oriented to person, place, and time. Appropriate mood and conversation.    X-ray reviewed    This dictation was created using voice recognition software. I have made reasonable attempts to correct errors, however, errors of grammar and content may exist.          Assessment/Plan:    1. Posterior neck pain  Discussed with patient the findings on the x-ray options for therapy. She is not undertaken regular anti-inflammatory therapy, chiropractic therapy or following the back exercise books. Offered again long discussion regarding maximizing therapy with conservative management more regular anti-inflammatory therapy. Back exercises, referral to physical therapy instead of chiropractic as she thinks that might be more appropriate. Referral made.   - REFERRAL TO PHYSICAL THERAPY Reason for Therapy: Eval/Treat/Report    2. Dysthymia  Patient with minimal insight into why she might not have done any of  the recommendations from the last office visit. Her Mini-Mental Status does not show any significant deficit. Continue ongoing monitoring.    3. Dental caries  At baseline.    4. Obesity (BMI 35.0-39.9 without comorbidity) (HCC)  No discussion on obesity issue is patient with poor compliance and insight.    Patient was seen for  25 minutes face to face of which more than 50% of the time was spent in counseling and coordination of care regarding the above problems.

## 2017-11-22 NOTE — PROGRESS NOTES
"MMSE    -What is the:  Year, season, date, day, month.               5/5 points    -Where are we:   State, county, town, hospital, floor.      5/5 points    -3 objects immediate recall.                                             3/3 points    -World backwards or serial sevens.                                 5/5 points    -Three-minute recall.                                                         3/3 points    -Name 2 objects.                                                               2/2 points    -Repeat the following.      \"No ifs and is or buts\"                                                    1/1 point    -Follow a 3 stage command.      Paper right hand, fold in half, place on floor.                 3/3 points    -Follow a written command.       Close your eyes                                                          1/1 point    -Write a complete sentence.                                           1/1 point    -Copy a design.                                                                1/1 point    Total                                                                                  30/30    Cutoff of 24 indicating dementia.          "

## 2017-11-22 NOTE — ASSESSMENT & PLAN NOTE
Patient reports an episode of dehydration last week, felt weak, fell at home with position change, was seen at [facility], we do not have records, per patient dx as dehydration.  She admits to having not done anything previously discussed other than babysitting.

## 2018-01-16 DIAGNOSIS — E78.00 HYPERCHOLESTEROLEMIA: ICD-10-CM

## 2018-01-16 DIAGNOSIS — I10 ESSENTIAL HYPERTENSION: ICD-10-CM

## 2018-01-16 DIAGNOSIS — M10.9 GOUT, UNSPECIFIED CAUSE, UNSPECIFIED CHRONICITY, UNSPECIFIED SITE: ICD-10-CM

## 2018-01-17 RX ORDER — ALLOPURINOL 100 MG/1
TABLET ORAL
Qty: 180 TAB | Refills: 1 | Status: SHIPPED | OUTPATIENT
Start: 2018-01-17 | End: 2018-03-21 | Stop reason: SDUPTHER

## 2018-01-17 RX ORDER — LISINOPRIL AND HYDROCHLOROTHIAZIDE 25; 20 MG/1; MG/1
TABLET ORAL
Qty: 90 TAB | Refills: 1 | Status: SHIPPED | OUTPATIENT
Start: 2018-01-17 | End: 2018-03-21 | Stop reason: SDUPTHER

## 2018-01-17 RX ORDER — LOVASTATIN 10 MG/1
TABLET ORAL
Qty: 90 TAB | Refills: 1 | Status: SHIPPED | OUTPATIENT
Start: 2018-01-17 | End: 2018-03-21 | Stop reason: SDUPTHER

## 2018-01-17 NOTE — TELEPHONE ENCOUNTER
*PT NEEDS TO UPDATE LABS*  Was the patient seen in the last year in this department? Yes     Does patient have an active prescription for medications requested? No     Received Request Via: Pharmacy      Pt met protocol?: No     LAST OV 11/22/2017    BP Readings from Last 1 Encounters:   11/22/17 110/68     Lab Results   Component Value Date/Time    CHOLSTRLTOT 167 09/13/2016 10:04 AM    LDL 97 09/13/2016 10:04 AM    HDL 46 09/13/2016 10:04 AM    TRIGLYCERIDE 119 09/13/2016 10:04 AM       Lab Results   Component Value Date/Time    SODIUM 142 03/08/2017 07:57 AM    POTASSIUM 3.9 03/08/2017 07:57 AM    CHLORIDE 107 03/08/2017 07:57 AM    CO2 24 03/08/2017 07:57 AM    GLUCOSE 87 03/08/2017 07:57 AM    BUN 29 (H) 03/08/2017 07:57 AM    CREATININE 1.28 03/08/2017 07:57 AM     Lab Results   Component Value Date/Time    ALKPHOSPHAT 100 (H) 03/08/2017 07:57 AM    ASTSGOT 21 03/08/2017 07:57 AM    ALTSGPT 15 03/08/2017 07:57 AM    TBILIRUBIN 0.6 03/08/2017 07:57 AM        Lab Results  Component Value Date/Time   URICACID 7.2 03/08/2017 0757     No results found for: HBA1C  No results found for: AVGLUC    Lab Results  Component Value Date/Time   CHOLSTRLTOT 167 09/13/2016 1004       Lab Results  Component Value Date/Time   TRIGLYCERIDE 119 09/13/2016 1004       Lab Results  Component Value Date/Time   HDL 46 09/13/2016 1004       Lab Results  Component Value Date/Time   LDL 97 09/13/2016 1004

## 2018-01-17 NOTE — TELEPHONE ENCOUNTER
Refill X 6 months, sent to pharmacy.Pt. Seen in the last 6 months per protocol.   Lab Results   Component Value Date/Time    SODIUM 142 03/08/2017 07:57 AM    POTASSIUM 3.9 03/08/2017 07:57 AM    CHLORIDE 107 03/08/2017 07:57 AM    CO2 24 03/08/2017 07:57 AM    GLUCOSE 87 03/08/2017 07:57 AM    BUN 29 (H) 03/08/2017 07:57 AM    CREATININE 1.28 03/08/2017 07:57 AM

## 2018-02-14 ENCOUNTER — HOSPITAL ENCOUNTER (OUTPATIENT)
Dept: LAB | Facility: MEDICAL CENTER | Age: 68
End: 2018-02-14
Attending: NURSE PRACTITIONER
Payer: MEDICARE

## 2018-02-14 DIAGNOSIS — E78.00 HYPERCHOLESTEROLEMIA: ICD-10-CM

## 2018-02-14 LAB
CHOLEST SERPL-MCNC: 132 MG/DL (ref 100–199)
HDLC SERPL-MCNC: 37 MG/DL
LDLC SERPL CALC-MCNC: 72 MG/DL
TRIGL SERPL-MCNC: 116 MG/DL (ref 0–149)

## 2018-02-14 PROCEDURE — 36415 COLL VENOUS BLD VENIPUNCTURE: CPT

## 2018-02-14 PROCEDURE — 80061 LIPID PANEL: CPT

## 2018-02-16 ENCOUNTER — HOSPITAL ENCOUNTER (OUTPATIENT)
Dept: LAB | Facility: MEDICAL CENTER | Age: 68
End: 2018-02-16
Attending: PHYSICIAN ASSISTANT
Payer: MEDICARE

## 2018-02-16 ENCOUNTER — OFFICE VISIT (OUTPATIENT)
Dept: URGENT CARE | Facility: PHYSICIAN GROUP | Age: 68
End: 2018-02-16
Payer: MEDICARE

## 2018-02-16 VITALS
WEIGHT: 203 LBS | BODY MASS INDEX: 39.85 KG/M2 | SYSTOLIC BLOOD PRESSURE: 118 MMHG | HEIGHT: 60 IN | DIASTOLIC BLOOD PRESSURE: 64 MMHG | OXYGEN SATURATION: 95 % | RESPIRATION RATE: 16 BRPM | HEART RATE: 60 BPM | TEMPERATURE: 97.1 F

## 2018-02-16 DIAGNOSIS — M54.2 CHRONIC NECK PAIN: ICD-10-CM

## 2018-02-16 DIAGNOSIS — E66.9 OBESITY (BMI 30-39.9): ICD-10-CM

## 2018-02-16 DIAGNOSIS — G89.29 CHRONIC NECK PAIN: ICD-10-CM

## 2018-02-16 DIAGNOSIS — M10.9 GOUT, UNSPECIFIED CAUSE, UNSPECIFIED CHRONICITY, UNSPECIFIED SITE: Primary | ICD-10-CM

## 2018-02-16 LAB — URATE SERPL-MCNC: 6.8 MG/DL (ref 1.9–8.2)

## 2018-02-16 PROCEDURE — 99214 OFFICE O/P EST MOD 30 MIN: CPT | Performed by: PHYSICIAN ASSISTANT

## 2018-02-16 PROCEDURE — 84550 ASSAY OF BLOOD/URIC ACID: CPT

## 2018-02-16 PROCEDURE — 36415 COLL VENOUS BLD VENIPUNCTURE: CPT

## 2018-02-16 RX ORDER — DICLOFENAC SODIUM 75 MG/1
75 TABLET, DELAYED RELEASE ORAL 2 TIMES DAILY
Qty: 30 TAB | Refills: 0 | Status: SHIPPED | OUTPATIENT
Start: 2018-02-16 | End: 2018-03-21

## 2018-02-16 NOTE — PROGRESS NOTES
Chief Complaint   Patient presents with   • Gout     x1wk R foot       HISTORY OF PRESENT ILLNESS: Patient is a 67 y.o. female who presents today because she has 2 issues to discuss. She had an appointment with her primary care provider today, but missed it because she had the wrong time.    #1 . She has a history of gout. She wants no further gout is flaring up. She takes allopurinol 100 mg twice a day. She has some shooting pain on the top of her right foot. This going on for several days but states that has resolved a little bit today. She is interested in getting her uric acid levels checked. She had uric acid levels checked about 10 months ago, it was 7.2. She has been taking some ibuprofen for this symptom, which has been helping to some degree. She also takes it for complaint #2.    Complaint #2. She has chronic posterior neck pain. She denies any radiating upper extremity pain or paresthesias. This pain has been chronic for months to years. Typically it does improve with some ibuprofen, but has not been improving with ibuprofen recently    Patient Active Problem List    Diagnosis Date Noted   • CHF with left ventricular diastolic dysfunction, NYHA class 1 (CMS-[facility]) 06/14/2015     Priority: High   • Decreased GFR 09/25/2013     Priority: High   • Posterior neck pain 03/22/2017     Priority: Medium   • Obesity (BMI 35.0-39.9 without comorbidity) ([facility]) 02/25/2015     Priority: Medium   • Hypercholesterolemia 08/15/2013     Priority: Medium   • Hypertension      Priority: Medium   • Obesity (BMI 30-39.9) 02/16/2018   • Dysthymia 09/15/2017   • Right foot pain 03/22/2017   • Scalp cyst 09/15/2016   • Iron deficiency anemia due to chronic blood loss 06/07/2016   • Localized primary osteoarthritis of left lower leg 03/14/2016   • Localized primary osteoarthritis of lower leg 03/14/2016   • Dental caries 04/22/2015   • Chronic pain of left knee 08/29/2014   • Gout, chronic 07/16/2013   • Arthritis         Allergies:Amoxicillin; Percocet [apap-fd&c red #40 [place]-oxycodone]; and Tramadol    Current Outpatient Prescriptions Ordered in [facility]   Medication Sig Dispense Refill   • diclofenac EC (VOLTAREN) 75 MG Tablet Delayed Response Take 1 Tab by mouth 2 times a day. 30 Tab 0   • lovastatin (MEVACOR) 10 MG tablet TAKE ONE TABLET BY MOUTH ONCE DAILY 90 Tab 1   • allopurinol (ZYLOPRIM) 100 MG Tab TAKE ONE TABLET BY MOUTH TWICE DAILY 180 Tab 1   • lisinopril-hydrochlorothiazide (PRINZIDE, ZESTORETIC) 20-25 MG per tablet TAKE ONE TABLET BY MOUTH ONCE DAILY 90 Tab 1   • escitalopram (LEXAPRO) 10 MG Tab Take 1 Tab by mouth every day. 30 Tab 5   • Cholecalciferol ([employer]S VITAMIN D) 2000 UNIT Cap Take 1 Cap by mouth every bedtime. For vitamin D deficiency 90 Cap 3   • ibuprofen (MOTRIN) 800 MG Tab Take 1 Tab by mouth 2 times a day as needed. 60 Tab 1   • Multiple Vitamins-Minerals (MULTIVITAMIN PO) Take  by mouth.     • aspirin EC (ECOTRIN) 81 MG TBEC Take 81 mg by mouth every day.       No current [facility]-ordered facility-administered medications on file.        Past Medical History:   Diagnosis Date   • Arthritis     back, knee   • Cellulitis of hand 2015   • Cold 2016    stomach flu   • Congestive heart failure (CMS-HCC)    • Decreased GFR 2013   • Dental disorder     missing teeth   • GOUT 2013   • High cholesterol    • Hypercholesterolemia 8/15/2013   • Hypertension    • Iron deficiency anemia due to chronic blood loss 2016   • Obesity 2016   • Obesity (BMI 35.0-39.9 without comorbidity) 2015   • Pain     knees   • Scalp cyst 9/15/2016       Social History   Substance Use Topics   • Smoking status: Never Smoker   • Smokeless tobacco: Never Used   • Alcohol use No      Comment: quit 12 yrs ago, before that drank heavily       Family Status   Relation Status   • Mother Alive   • Father    •       Family History   Problem Relation Age of Onset   • Heart Disease Mother    • Heart Disease  Father    • Diabetes Father    • Hypertension         ROS:  Review of Systems   Constitutional: Negative for fever, chills, weight loss and malaise/fatigue.   HENT: Negative for ear pain, nosebleeds, congestion, sore throat and positive for Musculoskeletal neck pain.    Eyes: Negative for blurred vision.   Respiratory: Negative for cough, sputum production, shortness of breath and wheezing.    Cardiovascular: Negative for chest pain, palpitations, orthopnea and leg swelling.   Gastrointestinal: Negative for heartburn, nausea, vomiting and abdominal pain.   Genitourinary: Negative for dysuria, urgency and frequency.     Exam:  Blood pressure 118/64, pulse 60, temperature 36.2 °C (97.1 °F), resp. rate 16, height 1.524 m (5'), weight 92.1 kg (203 lb), SpO2 95 %, not currently breastfeeding.  General:  Well nourished, well developed female in NAD  Head:Normocephalic, atraumatic  Eyes: PERRLA, EOM within normal limits, no conjunctival injection, no scleral icterus, visual fields and acuity grossly intact.  Nose: Symmetrical without tenderness, no discharge.  Mouth: reasonable hygiene, no erythema exudates or tonsillar enlargement.  Neck: no masses, range of motion within normal limits, no tracheal deviation. No obvious thyroid enlargement.  Extremities: no clubbing, cyanosis, or edema.  Right foot is without any visual deformity, erythema, edema, warmth or ecchymosis. She has minimal tenderness to palpation between her second, third metatarsal. Good distal range of motion, strength, circulation and sensation.    Please note that this dictation was created using voice recognition software. I have made every reasonable attempt to correct obvious errors, but I expect that there are errors of grammar and possibly content that I did not discover before finalizing the note.    Assessment/Plan:  1. Gout, unspecified cause, unspecified chronicity, unspecified site  URIC ACID, SERUM   2. Obesity (BMI 30-39.9)  Patient identified  as having weight management issue.  Appropriate orders and counseling given.   3. Chronic neck pain  diclofenac EC (VOLTAREN) 75 MG Tablet Delayed Response       Followup with primary care in the next 7-10 days if not significantly improving, return to the urgent care or go to the emergency room sooner for any worsening of symptoms.

## 2018-02-20 ENCOUNTER — TELEPHONE (OUTPATIENT)
Dept: URGENT CARE | Facility: PHYSICIAN GROUP | Age: 68
End: 2018-02-20

## 2018-02-21 NOTE — TELEPHONE ENCOUNTER
----- Message from Ismael Srinivasan P.A.-C. sent at 2/17/2018  7:53 AM PST -----  Please call the patient, her uric acid level was normal, follow-up with primary care

## 2018-03-13 RX ORDER — ESCITALOPRAM OXALATE 10 MG/1
TABLET ORAL
Qty: 90 TAB | Refills: 0 | Status: SHIPPED | OUTPATIENT
Start: 2018-03-13 | End: 2018-03-21 | Stop reason: SDUPTHER

## 2018-03-21 ENCOUNTER — OFFICE VISIT (OUTPATIENT)
Dept: MEDICAL GROUP | Facility: PHYSICIAN GROUP | Age: 68
End: 2018-03-21
Payer: MEDICARE

## 2018-03-21 VITALS
SYSTOLIC BLOOD PRESSURE: 116 MMHG | HEART RATE: 62 BPM | OXYGEN SATURATION: 95 % | TEMPERATURE: 97.5 F | RESPIRATION RATE: 18 BRPM | DIASTOLIC BLOOD PRESSURE: 66 MMHG | WEIGHT: 203 LBS | BODY MASS INDEX: 37.36 KG/M2 | HEIGHT: 62 IN

## 2018-03-21 DIAGNOSIS — I10 ESSENTIAL HYPERTENSION: ICD-10-CM

## 2018-03-21 DIAGNOSIS — E66.9 OBESITY (BMI 35.0-39.9 WITHOUT COMORBIDITY): ICD-10-CM

## 2018-03-21 DIAGNOSIS — I50.30 CHF WITH LEFT VENTRICULAR DIASTOLIC DYSFUNCTION, NYHA CLASS 1 (HCC): ICD-10-CM

## 2018-03-21 DIAGNOSIS — K02.9 DENTAL CARIES: ICD-10-CM

## 2018-03-21 DIAGNOSIS — M10.9 GOUT, UNSPECIFIED CAUSE, UNSPECIFIED CHRONICITY, UNSPECIFIED SITE: ICD-10-CM

## 2018-03-21 DIAGNOSIS — E78.00 HYPERCHOLESTEROLEMIA: ICD-10-CM

## 2018-03-21 DIAGNOSIS — M1A.9XX1 CHRONIC GOUT WITH TOPHUS, UNSPECIFIED CAUSE, UNSPECIFIED SITE: Chronic | ICD-10-CM

## 2018-03-21 DIAGNOSIS — G89.29 CHRONIC PAIN OF LEFT KNEE: ICD-10-CM

## 2018-03-21 DIAGNOSIS — M54.2 POSTERIOR NECK PAIN: ICD-10-CM

## 2018-03-21 DIAGNOSIS — M25.562 CHRONIC PAIN OF LEFT KNEE: ICD-10-CM

## 2018-03-21 DIAGNOSIS — H61.22 CERUMEN DEBRIS ON TYMPANIC MEMBRANE OF LEFT EAR: ICD-10-CM

## 2018-03-21 PROCEDURE — 99214 OFFICE O/P EST MOD 30 MIN: CPT | Performed by: INTERNAL MEDICINE

## 2018-03-21 RX ORDER — ESCITALOPRAM OXALATE 10 MG/1
10 TABLET ORAL DAILY
Qty: 90 TAB | Refills: 3 | Status: SHIPPED | OUTPATIENT
Start: 2018-03-21 | End: 2019-06-04

## 2018-03-21 RX ORDER — CELECOXIB 100 MG/1
100 CAPSULE ORAL 2 TIMES DAILY
Qty: 60 CAP | Refills: 3 | Status: ON HOLD | OUTPATIENT
Start: 2018-03-21 | End: 2018-09-11

## 2018-03-21 RX ORDER — ALLOPURINOL 100 MG/1
100 TABLET ORAL 2 TIMES DAILY
Qty: 180 TAB | Refills: 3 | Status: SHIPPED | OUTPATIENT
Start: 2018-03-21 | End: 2019-06-04 | Stop reason: SDUPTHER

## 2018-03-21 RX ORDER — LOVASTATIN 10 MG/1
10 TABLET ORAL DAILY
Qty: 90 TAB | Refills: 1 | Status: SHIPPED | OUTPATIENT
Start: 2018-03-21 | End: 2018-11-02 | Stop reason: SDUPTHER

## 2018-03-21 RX ORDER — LISINOPRIL AND HYDROCHLOROTHIAZIDE 25; 20 MG/1; MG/1
1 TABLET ORAL DAILY
Qty: 90 TAB | Refills: 3 | Status: SHIPPED | OUTPATIENT
Start: 2018-03-21 | End: 2019-06-04 | Stop reason: SDUPTHER

## 2018-03-21 ASSESSMENT — PAIN SCALES - GENERAL: PAINLEVEL: 10=SEVERE PAIN

## 2018-03-21 ASSESSMENT — PATIENT HEALTH QUESTIONNAIRE - PHQ9
5. POOR APPETITE OR OVEREATING: 3 - NEARLY EVERY DAY
SUM OF ALL RESPONSES TO PHQ QUESTIONS 1-9: 8
CLINICAL INTERPRETATION OF PHQ2 SCORE: 2

## 2018-03-21 NOTE — PROGRESS NOTES
Chief Complaint   Patient presents with   • Ear Fullness     left ear plugged x4days    • Results     labs    • Anxiety     med refill        HISTORY OF PRESENT ILLNESS: Patient is a 67 y.o. female established patient who presents today to discuss the medical issues below.    Posterior neck pain  Patient complaining today of ongoing neck discomfort. She was referred to physical therapy in November however did not do that. She continues with neck aching no radiation to represent extremity no numbness tingling weakness of her extremities.    Obesity (BMI 35.0-39.9 without comorbidity) ([facility])  Weight status quo. She states she is exercising by carrying wood first dose. No additional exercise. Not following diet.    Hypertension  Not following at home however she is taking her medications regularly.    Hypercholesterolemia  Continues on statin medications. Weight unchanged. No clear diet management. Had lab work done.    Gout, chronic  Continues on allopurinol had lab work. No recent specific joint outbreaks    Dental caries  Denies any dental pain. Has still not seen a dentist.    CHF with left ventricular diastolic dysfunction, NYHA class 1 (CMS-HCC)  Denies chest pain palpitations edema. No dyspnea with exertion however the her exercise level is limited.      Patient Active Problem List    Diagnosis Date Noted   • CHF with left ventricular diastolic dysfunction, NYHA class 1 (CMS-HCC) 06/14/2015     Priority: High   • Decreased GFR 09/25/2013     Priority: High   • Posterior neck pain 03/22/2017     Priority: Medium   • Obesity (BMI 35.0-39.9 without comorbidity) ([facility]) 02/25/2015     Priority: Medium   • Hypercholesterolemia 08/15/2013     Priority: Medium   • Hypertension      Priority: Medium   • Obesity (BMI 30-39.9) 02/16/2018   • Dysthymia 09/15/2017   • Right foot pain 03/22/2017   • Scalp cyst 09/15/2016   • Localized primary osteoarthritis of left lower leg 03/14/2016   • Localized primary osteoarthritis of  lower leg 2016   • Dental caries 2015   • Chronic pain of left knee 2014   • Gout, chronic 2013   • Arthritis        Allergies:Amoxicillin; Percocet [apap-fd&c red #40 [place]-oxycodone]; and Tramadol    Current Outpatient Prescriptions   Medication Sig Dispense Refill   • celecoxib (CELEBREX) 100 MG Cap Take 1 Cap by mouth 2 times a day. 60 Cap 3   • escitalopram (LEXAPRO) 10 MG Tab Take 1 Tab by mouth every day. 90 Tab 3   • lovastatin (MEVACOR) 10 MG tablet Take 1 Tab by mouth every day. 90 Tab 1   • allopurinol (ZYLOPRIM) 100 MG Tab Take 1 Tab by mouth 2 times a day. 180 Tab 3   • lisinopril-hydrochlorothiazide (PRINZIDE, ZESTORETIC) 20-25 MG per tablet Take 1 Tab by mouth every day. 90 Tab 3   • Cholecalciferol (CVS VITAMIN D) 2000 UNIT Cap Take 1 Cap by mouth every bedtime. For vitamin D deficiency 90 Cap 3   • Multiple Vitamins-Minerals (MULTIVITAMIN PO) Take  by mouth.     • aspirin EC (ECOTRIN) 81 MG TBEC Take 81 mg by mouth every day.     • ibuprofen (MOTRIN) 800 MG Tab Take 1 Tab by mouth 2 times a day as needed. 60 Tab 1     No current facility-administered medications for this visit.          Past Medical History:   Diagnosis Date   • Arthritis     back, knee   • Cellulitis of hand 2015   • Cold 2016    stomach flu   • Congestive heart failure (CMS-HCC)    • Decreased GFR 2013   • Dental disorder     missing teeth   • GOUT 2013   • High cholesterol    • Hypercholesterolemia 8/15/2013   • Hypertension    • Iron deficiency anemia due to chronic blood loss 2016   • Obesity 2016   • Obesity (BMI 35.0-39.9 without comorbidity) 2015   • Pain     knees   • Scalp cyst 9/15/2016       Social History   Substance Use Topics   • Smoking status: Never Smoker   • Smokeless tobacco: Never Used   • Alcohol use No      Comment: quit 12 yrs ago, before that drank heavily       Family Status   Relation Status   • Mother Alive   • Father    •       Family  "History   Problem Relation Age of Onset   • Heart Disease Mother    • Heart Disease Father    • Diabetes Father    • Hypertension         ROS:    Respiratory: Negative for cough, sputum production, shortness of breath or wheezing.    Cardiovascular: Negative for chest pain, palpitations, orthopnea, dyspnea with exertion or edema.   Gastrointestinal: Negative for GI upset, nausea, vomiting, abdominal pain, constipation or diarrhea.   Genitourinary: Negative for dysuria, urgency, hesitancy or frequency.       Exam:    Blood pressure 116/66, pulse 62, temperature 36.4 °C (97.5 °F), resp. rate 18, height 1.562 m (5' 1.5\"), weight 92.1 kg (203 lb), SpO2 95 %.  General:  Well nourished, well developed female in NAD.  HENT: Left TM completely occluded with cerumen. Right is clear and the TM is with no inflammation. Nasal and oral mucosa was no lesions. Teeth in very poor repair  Pulmonary: Clear to ausculation and percussion.  Normal effort. No rales, rhonchi, or wheezing.  Cardiovascular: Regular rate and rhythm without murmur.   Abdomen: Normal bowel sounds soft and nontender no palpable liver spleen bladder mass.  Extremities: No LE edema noted.  Neuro: Grossly nonfocal.  Psych: Alert and oriented to person, place, and time. Appropriate mood and conversation.    LABS: Results reviewed and discussed with the patient, questions answered.      This dictation was created using voice recognition software. I have made reasonable attempts to correct errors, however, errors of grammar and content may exist.          Assessment/Plan:    1. Cerumen debris on tympanic membrane of left ear  Lavage today inadequate, required curette. removal by me to attain clear canal.   - [place] REMOVE IMPACTED EAR WAX    2. Posterior neck pain  Trial changed to Celebrex, referral to physical therapy.  - REFERRAL TO PHYSICAL THERAPY Reason for Therapy: Eval/Treat/Report    3. Hypercholesterolemia  Continues on Mevacor LDL controlled at 72. Normal " liver function  - lovastatin (MEVACOR) 10 MG tablet; Take 1 Tab by mouth every day.  Dispense: 90 Tab; Refill: 1    4. Gout, unspecified cause, unspecified chronicity, unspecified site  No clinical apparatus uric acid 6. a  - allopurinol (ZYLOPRIM) 100 MG Tab; Take 1 Tab by mouth 2 times a day.  Dispense: 180 Tab; Refill: 3    5. Essential hypertension  Well-controlled with ongoing medications. Renal function stable at a GFR of 41  - lisinopril-hydrochlorothiazide (PRINZIDE, ZESTORETIC) 20-25 MG per tablet; Take 1 Tab by mouth every day.  Dispense: 90 Tab; Refill: 3    6. Chronic pain of left knee  Offered referral she wants to hold off    7. Obesity (BMI 35.0-39.9 without comorbidity) ([facility])  Discussed diet, exercise, weight loss, behavioral modification, portion size management.      8. Chronic gout with tophus, unspecified cause, unspecified site  As above    9. Dental caries  Reviewed implications recommendation for dental treatment    10. CHF with left ventricular diastolic dysfunction, NYHA class 1 (CMS-[facility])  Currently clinically compensated discuss weight loss regular physical exercise and low-salt diet.    Patient was seen for  25 minutes face to face of which more than 50% of the time was spent in counseling and coordination of care regarding the above problems.

## 2018-03-21 NOTE — ASSESSMENT & PLAN NOTE
Patient complaining today of ongoing neck discomfort. She was referred to physical therapy in November however did not do that. She continues with neck aching no radiation to represent extremity no numbness tingling weakness of her extremities.

## 2018-03-21 NOTE — ASSESSMENT & PLAN NOTE
Weight status quo. She states she is exercising by carrying wood first dose. No additional exercise. Not following diet.

## 2018-03-21 NOTE — ASSESSMENT & PLAN NOTE
Denies chest pain palpitations edema. No dyspnea with exertion however the her exercise level is limited.

## 2018-03-21 NOTE — PATIENT INSTRUCTIONS
Stop the diclofenac  Start celebrex, 1 tab 2 x a day with food, stop if upset stomach.    Physical therapy exercises for your neck and shoulders to be done regularly at home    Contact us if you decide you want additional referral for your neck and shoulder.

## 2018-06-06 ENCOUNTER — OFFICE VISIT (OUTPATIENT)
Dept: MEDICAL GROUP | Facility: PHYSICIAN GROUP | Age: 68
End: 2018-06-06
Payer: MEDICARE

## 2018-06-06 VITALS
HEART RATE: 63 BPM | SYSTOLIC BLOOD PRESSURE: 114 MMHG | BODY MASS INDEX: 37.54 KG/M2 | TEMPERATURE: 97.3 F | OXYGEN SATURATION: 96 % | RESPIRATION RATE: 18 BRPM | HEIGHT: 62 IN | DIASTOLIC BLOOD PRESSURE: 70 MMHG | WEIGHT: 204 LBS

## 2018-06-06 DIAGNOSIS — E66.9 OBESITY (BMI 30-39.9): ICD-10-CM

## 2018-06-06 DIAGNOSIS — I50.30 CHF WITH LEFT VENTRICULAR DIASTOLIC DYSFUNCTION, NYHA CLASS 1 (HCC): ICD-10-CM

## 2018-06-06 DIAGNOSIS — Z12.39 SCREENING FOR BREAST CANCER: ICD-10-CM

## 2018-06-06 DIAGNOSIS — E78.00 HYPERCHOLESTEROLEMIA: ICD-10-CM

## 2018-06-06 DIAGNOSIS — I10 ESSENTIAL HYPERTENSION: ICD-10-CM

## 2018-06-06 DIAGNOSIS — E66.01 MORBID (SEVERE) OBESITY DUE TO EXCESS CALORIES (HCC): ICD-10-CM

## 2018-06-06 DIAGNOSIS — M1A.9XX1 CHRONIC GOUT WITH TOPHUS, UNSPECIFIED CAUSE, UNSPECIFIED SITE: Chronic | ICD-10-CM

## 2018-06-06 DIAGNOSIS — M48.9 CERVICAL SPINE DISEASE: ICD-10-CM

## 2018-06-06 PROCEDURE — 99214 OFFICE O/P EST MOD 30 MIN: CPT | Performed by: INTERNAL MEDICINE

## 2018-06-06 RX ORDER — METHYLPREDNISOLONE 4 MG/1
TABLET ORAL
Qty: 1 KIT | Refills: 0 | Status: SHIPPED | OUTPATIENT
Start: 2018-06-06 | End: 2018-09-07

## 2018-06-06 ASSESSMENT — PAIN SCALES - GENERAL: PAINLEVEL: 8=MODERATE-SEVERE PAIN

## 2018-06-06 NOTE — PROGRESS NOTES
Chief Complaint   Patient presents with   • Neck Pain     FV Neck pain/ Physical therapy    • Immunizations     TDAP       HISTORY OF PRESENT ILLNESS: Patient is a 67 y.o. female established patient who presents today to discuss the medical issues below.    Cervical spine disease  Patient unfortunately continues with neck pain.  She states it is present mostly on the right side.  She does okay as long as she does not turn her head.  Did not feel the Celebrex was particularly helpful.  She has done physical therapy exercises helped only a little bit.  She complains of right arm aching, mostly at the upper biceps area, aches, shooting pain down into the forearm, no weakness.    Obesity (BMI 30-39.9)  Unchanged. Limited insight.     CHF with left ventricular diastolic dysfunction, NYHA class 1 (CMS-HCC)  Patient denies any problems with chest pain palpitations or edema.  She admits to not exercising on a regular basis.    Hypertension  Patient is not following blood pressure at home, she does continue on her lisinopril hydrochlorothiazide however.  Admits to not exercising on a regular basis.  Weight is unchanged.    Hypercholesterolemia  Patient continues on with lovastatin.    Gout, chronic  Patient denies any gout attacks.  She is continuing on her allopurinol.      Patient Active Problem List    Diagnosis Date Noted   • CHF with left ventricular diastolic dysfunction, NYHA class 1 (CMS-[facility]) 06/14/2015     Priority: High   • Decreased GFR 09/25/2013     Priority: High   • Cervical spine disease 03/22/2017     Priority: Medium   • Obesity (BMI 35.0-39.9 without comorbidity) ([facility]) 02/25/2015     Priority: Medium   • Hypercholesterolemia 08/15/2013     Priority: Medium   • Hypertension      Priority: Medium   • Obesity (BMI 30-39.9) 02/16/2018   • Dysthymia 09/15/2017   • Right foot pain 03/22/2017   • Scalp cyst 09/15/2016   • Localized primary osteoarthritis of left lower leg 03/14/2016   • Localized primary  osteoarthritis of lower leg 03/14/2016   • Dental caries 04/22/2015   • Chronic pain of left knee 08/29/2014   • Gout, chronic 07/16/2013   • Arthritis        Allergies:Amoxicillin; Percocet [apap-fd&c red #40 [place]-oxycodone]; and Tramadol    Current Outpatient Prescriptions   Medication Sig Dispense Refill   • MethylPREDNISolone (MEDROL DOSEPAK) 4 MG Tablet Therapy Pack As per Dose Ayden 1 Kit 0   • celecoxib (CELEBREX) 100 MG Cap Take 1 Cap by mouth 2 times a day. 60 Cap 3   • escitalopram (LEXAPRO) 10 MG Tab Take 1 Tab by mouth every day. 90 Tab 3   • lovastatin (MEVACOR) 10 MG tablet Take 1 Tab by mouth every day. 90 Tab 1   • allopurinol (ZYLOPRIM) 100 MG Tab Take 1 Tab by mouth 2 times a day. 180 Tab 3   • lisinopril-hydrochlorothiazide (PRINZIDE, ZESTORETIC) 20-25 MG per tablet Take 1 Tab by mouth every day. 90 Tab 3   • Cholecalciferol (CVS VITAMIN D) 2000 UNIT Cap Take 1 Cap by mouth every bedtime. For vitamin D deficiency 90 Cap 3   • Multiple Vitamins-Minerals (MULTIVITAMIN PO) Take  by mouth.     • aspirin EC (ECOTRIN) 81 MG TBEC Take 81 mg by mouth every day.     • ibuprofen (MOTRIN) 800 MG Tab Take 1 Tab by mouth 2 times a day as needed. 60 Tab 1     No current facility-administered medications for this visit.          Past Medical History:   Diagnosis Date   • Arthritis     back, knee   • Cellulitis of hand 4/22/2015   • Cold 2/17/2016    stomach flu   • Congestive heart failure (HCC)    • Decreased GFR 9/25/2013   • Dental disorder     missing teeth   • GOUT 7/16/2013   • High cholesterol    • Hypercholesterolemia 8/15/2013   • Hypertension    • Iron deficiency anemia due to chronic blood loss 6/7/2016   • Obesity 6/7/2016   • Obesity (BMI 35.0-39.9 without comorbidity) 2/25/2015   • Pain     knees   • Scalp cyst 9/15/2016       Social History   Substance Use Topics   • Smoking status: Never Smoker   • Smokeless tobacco: Never Used   • Alcohol use No      Comment: quit 12 yrs ago, before that drank  "heavily       Family Status   Relation Status   • Mother Alive   • Father    •       Family History   Problem Relation Age of Onset   • Heart Disease Mother    • Heart Disease Father    • Diabetes Father    • Hypertension         ROS:    Respiratory: Negative for cough, sputum production, shortness of breath or wheezing.    Cardiovascular: Negative for chest pain, palpitations, orthopnea, dyspnea with exertion or edema.   Gastrointestinal: Negative for GI upset, nausea, vomiting, abdominal pain, constipation or diarrhea.   Genitourinary: Negative for dysuria, urgency, hesitancy or frequency.       Exam:    Blood pressure 114/70, pulse 63, temperature 36.3 °C (97.3 °F), resp. rate 18, height 1.562 m (5' 1.5\"), weight 92.5 kg (204 lb), SpO2 96 %.  General:  Well nourished, well developed female in NAD.  HENT: Normocephalic, bilateral TMs are intact, nasal and oral mucosa with no lesions,   Neck: Minimal diffuse posterior tenderness in the area adjacent C3-4.  There is some palpable muscle spasm along the right muscle groups.  She tends to sit with her neck and right rotation.  Pulmonary: Clear to ausculation and percussion.  Normal effort. No rales, rhonchi, or wheezing.  Cardiovascular: Regular rate and rhythm without murmur.   Abdomen: Normal bowel sounds soft and nontender no palpable liver spleen bladder mass.  Extremities: No LE edema noted.  Neuro: Grossly nonfocal.  Intact to light touch sensation and strength i testing bilateral upper extremities.  No tenderness to palpation along the right arm.  Full range of motion.  Psych: Alert and oriented to person, place, and time. Appropriate mood and conversation.        This dictation was created using voice recognition software. I have made reasonable attempts to correct errors, however, errors of grammar and content may exist.          Assessment/Plan:    1. Cervical spine disease  Patient now with radiation of pain consistent with a right arm neuropathy.  " X-ray is positive for degenerative spine disease.  Proceed to MRI referral to neurosurgery.  Monitor with a course of Medrol Dosepak.  She is utilizing Celebrex, discussed heat.  - MR-CERVICAL SPINE-W/O; Future  - REFERRAL TO NEUROSURGERY  - MethylPREDNISolone (MEDROL DOSEPAK) 4 MG Tablet Therapy Pack; As per Dose Ayden  Dispense: 1 Kit; Refill: 0    2. Obesity (BMI 30-39.9)  As discussed below    3. Essential hypertension  Excellent control continuing on medications will be due for laboratory assessment follow-up.  - COMP METABOLIC PANEL; Future  - CBC WITH DIFFERENTIAL; Future    4. Hypercholesterolemia  Continues on statin ongoing laboratory monitoring recommended  - LIPID PROFILE; Future    5. Screening for breast cancer  Reviewed recommendations for breast cancer screening  - MA-SCREEN MAMMO W/CAD-BILAT; Future    6. CHF with left ventricular diastolic dysfunction, NYHA class 1 (CMS-HCC)  Clinically stable with no evidence of exacerbation.  Monitor with Medrol Dosepak    7. Chronic gout with tophus, unspecified cause, unspecified site  No evidence of breakthrough serum levels have been therapeutic.  Ongoing medication support    8. Morbid (severe) obesity due to excess calories (HCC)  Overall prognosis poor, patient with minimal insight and limited ability to modify lifestyle.  Support, encourage, monitor.      Patient was seen for  25 minutes face to face of which more than 50% of the time was spent in counseling and coordination of care regarding the above problems.

## 2018-06-06 NOTE — ASSESSMENT & PLAN NOTE
Patient unfortunately continues with neck pain.  She states it is present mostly on the right side.  She does okay as long as she does not turn her head.  Did not feel the Celebrex was particularly helpful.  She has done physical therapy exercises helped only a little bit.  She complains of right arm aching, mostly at the upper biceps area, aches, shooting pain down into the forearm, no weakness.

## 2018-06-06 NOTE — ASSESSMENT & PLAN NOTE
Patient is not following blood pressure at home, she does continue on her lisinopril hydrochlorothiazide however.  Admits to not exercising on a regular basis.  Weight is unchanged.

## 2018-06-06 NOTE — ASSESSMENT & PLAN NOTE
Patient denies any problems with chest pain palpitations or edema.  She admits to not exercising on a regular basis.

## 2018-06-22 ENCOUNTER — TELEPHONE (OUTPATIENT)
Dept: MEDICAL GROUP | Facility: PHYSICIAN GROUP | Age: 68
End: 2018-06-22

## 2018-06-22 ENCOUNTER — PATIENT OUTREACH (OUTPATIENT)
Dept: HEALTH INFORMATION MANAGEMENT | Facility: OTHER | Age: 68
End: 2018-06-22

## 2018-06-22 NOTE — PROGRESS NOTES
1. Attempt #:Final    2. [facility] Verified: no    3. Verify PCP: yes    4. Care Team Updated:       •   DME Company (gait device, O2, CPAP, etc.): NO       •   Other Specialists (eye doctor, derm, GYN, cardiology, endo, etc): NO    5.  Reviewed/Updated the following with patient:       •   Communication Preference Obtained? YES       •   Preferred Pharmacy? YES       •   Preferred Lab? YES       •   Family History (document living status of immediate family members and if + hx of cancer, diabetes, hypertension, hyperlipidemia, heart attack, stroke) YES. Was Abstract Encounter opened and chart updated? YES    6. [employer] Activation: sent activation code    7. [employer]: yes    8. Annual Wellness Visit Scheduling  Scheduling Status:Scheduled      9. Care Gap Scheduling (Attempt to Schedule EACH Overdue Care Gap!)     Health Maintenance Due   Topic Date Due   • Annual Wellness Visit  1950   • IMM DTaP/Tdap/Td Vaccine (1 - Tdap) 06/18/1967 //Not indicated - Per PCP   • PAP SMEAR  06/18/1969 // Declined   • BONE DENSITY  06/18/2013 // Will schedule at later time   • MAMMOGRAM  09/04/2015// Will schedule later        Scheduled patient for Annual Wellness Visit    10. Patient was advised: “This is a free wellness visit. The provider will screen for medical conditions to help you stay healthy. If you have other concerns to address you may be asked to discuss these at a separate visit or there may be an additional fee.”     11. Patient was informed to arrive 15 min prior to their scheduled appointment and bring in their medication bottles.

## 2018-06-22 NOTE — TELEPHONE ENCOUNTER
Patient is over the recommended age and is showing overdue for a Pap Smear in Health Maintenance.    Please reply to this message as to whether these tests are appropriate and I will update the Health Maintenance Topic or contact the patient to schedule.

## 2018-07-03 ENCOUNTER — OFFICE VISIT (OUTPATIENT)
Dept: URGENT CARE | Facility: PHYSICIAN GROUP | Age: 68
End: 2018-07-03
Payer: MEDICARE

## 2018-07-03 ENCOUNTER — HOSPITAL ENCOUNTER (OUTPATIENT)
Dept: LAB | Facility: MEDICAL CENTER | Age: 68
End: 2018-07-03
Attending: INTERNAL MEDICINE
Payer: MEDICARE

## 2018-07-03 VITALS
RESPIRATION RATE: 16 BRPM | HEART RATE: 96 BPM | DIASTOLIC BLOOD PRESSURE: 68 MMHG | SYSTOLIC BLOOD PRESSURE: 114 MMHG | OXYGEN SATURATION: 94 % | WEIGHT: 202 LBS | TEMPERATURE: 97.3 F | HEIGHT: 62 IN | BODY MASS INDEX: 37.17 KG/M2

## 2018-07-03 DIAGNOSIS — R42 EPISODIC LIGHTHEADEDNESS: ICD-10-CM

## 2018-07-03 DIAGNOSIS — B35.4 TINEA CORPORIS: ICD-10-CM

## 2018-07-03 DIAGNOSIS — I10 ESSENTIAL HYPERTENSION: ICD-10-CM

## 2018-07-03 LAB
ALBUMIN SERPL BCP-MCNC: 3.8 G/DL (ref 3.2–4.9)
ALBUMIN/GLOB SERPL: 1.6 G/DL
ALP SERPL-CCNC: 90 U/L (ref 30–99)
ALT SERPL-CCNC: 15 U/L (ref 2–50)
ANION GAP SERPL CALC-SCNC: 10 MMOL/L (ref 0–11.9)
AST SERPL-CCNC: 20 U/L (ref 12–45)
BASOPHILS # BLD AUTO: 0.9 % (ref 0–1.8)
BASOPHILS # BLD: 0.07 K/UL (ref 0–0.12)
BILIRUB SERPL-MCNC: 0.5 MG/DL (ref 0.1–1.5)
BUN SERPL-MCNC: 31 MG/DL (ref 8–22)
CALCIUM SERPL-MCNC: 10.1 MG/DL (ref 8.5–10.5)
CHLORIDE SERPL-SCNC: 106 MMOL/L (ref 96–112)
CO2 SERPL-SCNC: 25 MMOL/L (ref 20–33)
CREAT SERPL-MCNC: 1.66 MG/DL (ref 0.5–1.4)
EOSINOPHIL # BLD AUTO: 0.25 K/UL (ref 0–0.51)
EOSINOPHIL NFR BLD: 3.3 % (ref 0–6.9)
ERYTHROCYTE [DISTWIDTH] IN BLOOD BY AUTOMATED COUNT: 47.5 FL (ref 35.9–50)
GLOBULIN SER CALC-MCNC: 2.4 G/DL (ref 1.9–3.5)
GLUCOSE SERPL-MCNC: 119 MG/DL (ref 65–99)
HCT VFR BLD AUTO: 43.1 % (ref 37–47)
HGB BLD-MCNC: 13.7 G/DL (ref 12–16)
IMM GRANULOCYTES # BLD AUTO: 0.03 K/UL (ref 0–0.11)
IMM GRANULOCYTES NFR BLD AUTO: 0.4 % (ref 0–0.9)
LYMPHOCYTES # BLD AUTO: 1.35 K/UL (ref 1–4.8)
LYMPHOCYTES NFR BLD: 17.8 % (ref 22–41)
MCH RBC QN AUTO: 28.5 PG (ref 27–33)
MCHC RBC AUTO-ENTMCNC: 31.8 G/DL (ref 33.6–35)
MCV RBC AUTO: 89.6 FL (ref 81.4–97.8)
MONOCYTES # BLD AUTO: 0.5 K/UL (ref 0–0.85)
MONOCYTES NFR BLD AUTO: 6.6 % (ref 0–13.4)
NEUTROPHILS # BLD AUTO: 5.4 K/UL (ref 2–7.15)
NEUTROPHILS NFR BLD: 71 % (ref 44–72)
NRBC # BLD AUTO: 0 K/UL
NRBC BLD-RTO: 0 /100 WBC
PLATELET # BLD AUTO: 251 K/UL (ref 164–446)
PMV BLD AUTO: 11.6 FL (ref 9–12.9)
POTASSIUM SERPL-SCNC: 3.6 MMOL/L (ref 3.6–5.5)
PROT SERPL-MCNC: 6.2 G/DL (ref 6–8.2)
RBC # BLD AUTO: 4.81 M/UL (ref 4.2–5.4)
SODIUM SERPL-SCNC: 141 MMOL/L (ref 135–145)
WBC # BLD AUTO: 7.6 K/UL (ref 4.8–10.8)

## 2018-07-03 PROCEDURE — 80053 COMPREHEN METABOLIC PANEL: CPT

## 2018-07-03 PROCEDURE — 36415 COLL VENOUS BLD VENIPUNCTURE: CPT

## 2018-07-03 PROCEDURE — 85025 COMPLETE CBC W/AUTO DIFF WBC: CPT

## 2018-07-03 PROCEDURE — 99214 OFFICE O/P EST MOD 30 MIN: CPT | Performed by: PHYSICIAN ASSISTANT

## 2018-07-03 RX ORDER — NYSTATIN 100000 U/G
1 CREAM TOPICAL 2 TIMES DAILY
Qty: 1 TUBE | Refills: 0 | Status: SHIPPED | OUTPATIENT
Start: 2018-07-03 | End: 2018-09-07

## 2018-07-03 NOTE — PROGRESS NOTES
Chief Complaint   Patient presents with   • Rash     neck & back       HISTORY OF PRESENT ILLNESS: Patient is a 68 y.o. female who presents today because she has 2 different complaints.    1.  She has a small rash on the left side of her neck.  States that her dog has a similar appearing rash on his skin and is being treated by the  or some type of cream.  Her rash is on the left side of her neck, minimally itchy.  Otherwise does not bother her.  She has not used any medication on it.    2.  She has had 2 episodes of lightheadedness in the last several months.  Last one was today, it has lasted about an hour.  She denies any visual disturbances or changes, denies any shortness of breath, chest pain, palpitations, denies any numbness or tingling in her face or extremities.  She has not been taking any medications for her symptoms    Patient Active Problem List    Diagnosis Date Noted   • CHF with left ventricular diastolic dysfunction, NYHA class 1 (CMS-HCC) 06/14/2015     Priority: High   • Decreased GFR 09/25/2013     Priority: High   • Cervical spine disease 03/22/2017     Priority: Medium   • Obesity (BMI 35.0-39.9 without comorbidity) ([facility]) 02/25/2015     Priority: Medium   • Hypercholesterolemia 08/15/2013     Priority: Medium   • Hypertension      Priority: Medium   • Obesity (BMI 30-39.9) 02/16/2018   • Dysthymia 09/15/2017   • Right foot pain 03/22/2017   • Scalp cyst 09/15/2016   • Localized primary osteoarthritis of left lower leg 03/14/2016   • Localized primary osteoarthritis of lower leg 03/14/2016   • Dental caries 04/22/2015   • Chronic pain of left knee 08/29/2014   • Gout, chronic 07/16/2013   • Arthritis        Allergies:Amoxicillin; Percocet [apap-fd&c red #40 [place]-oxycodone]; and Tramadol    Current Outpatient Prescriptions Ordered in [facility]   Medication Sig Dispense Refill   • nystatin (MYCOSTATIN) 317725 UNIT/GM Cream topical cream Apply 1 g to affected area(s) 2 times a day. 1  Tube 0   • MethylPREDNISolone (MEDROL DOSEPAK) 4 MG Tablet Therapy Pack As per Dose Ayden 1 Kit 0   • celecoxib (CELEBREX) 100 MG Cap Take 1 Cap by mouth 2 times a day. 60 Cap 3   • escitalopram (LEXAPRO) 10 MG Tab Take 1 Tab by mouth every day. 90 Tab 3   • lovastatin (MEVACOR) 10 MG tablet Take 1 Tab by mouth every day. 90 Tab 1   • allopurinol (ZYLOPRIM) 100 MG Tab Take 1 Tab by mouth 2 times a day. 180 Tab 3   • lisinopril-hydrochlorothiazide (PRINZIDE, ZESTORETIC) 20-25 MG per tablet Take 1 Tab by mouth every day. 90 Tab 3   • Cholecalciferol ([employer] VITAMIN D) 2000 UNIT Cap Take 1 Cap by mouth every bedtime. For vitamin D deficiency 90 Cap 3   • ibuprofen (MOTRIN) 800 MG Tab Take 1 Tab by mouth 2 times a day as needed. 60 Tab 1   • Multiple Vitamins-Minerals (MULTIVITAMIN PO) Take  by mouth.     • aspirin EC (ECOTRIN) 81 MG TBEC Take 81 mg by mouth every day.       No current [facility]-ordered facility-administered medications on file.        Past Medical History:   Diagnosis Date   • Arthritis     back, knee   • Cellulitis of hand 2015   • Cold 2016    stomach flu   • Congestive heart failure (HCC)    • Decreased GFR 2013   • Dental disorder     missing teeth   • GOUT 2013   • High cholesterol    • Hypercholesterolemia 8/15/2013   • Hypertension    • Iron deficiency anemia due to chronic blood loss 2016   • Obesity 2016   • Obesity (BMI 35.0-39.9 without comorbidity) 2015   • Pain     knees   • Scalp cyst 9/15/2016       Social History   Substance Use Topics   • Smoking status: Never Smoker   • Smokeless tobacco: Never Used   • Alcohol use No      Comment: quit 12 yrs ago, before that drank heavily       Family Status   Relation Status   • Mother    • Father    •     • Brother Alive     Family History   Problem Relation Age of Onset   • Heart Disease Mother    • Heart Disease Father    • Diabetes Father    • Hypertension     • Heart Disease Brother    • Hypertension  "Brother        ROS:  Review of Systems   Constitutional: Negative for fever, chills, weight loss and malaise/fatigue.   HENT: Negative for ear pain, nosebleeds, congestion, sore throat and neck pain.    Eyes: Negative for blurred vision.   Respiratory: Negative for cough, sputum production, shortness of breath and wheezing.    Cardiovascular: Negative for chest pain, palpitations, orthopnea and leg swelling.   Gastrointestinal: Negative for heartburn, nausea, vomiting and abdominal pain.   Genitourinary: Negative for dysuria, urgency and frequency.     Exam:  Blood pressure 114/68, pulse 96, temperature 36.3 °C (97.3 °F), resp. rate 16, height 1.562 m (5' 1.5\"), weight 91.6 kg (202 lb), SpO2 94 %.  General:  Well nourished, well developed female in NAD  Head:Normocephalic, atraumatic  Eyes: PERRLA, EOM within normal limits, no conjunctival injection, no scleral icterus, visual fields and acuity grossly intact.  Nose: Symmetrical without tenderness, no discharge.  Mouth: reasonable hygiene, no erythema exudates or tonsillar enlargement.  Neck: no masses, range of motion within normal limits, no tracheal deviation. No obvious thyroid enlargement.  Skin: On the left side of her neck there is a 3 cm diameter macular salmon colored rash with flaking edges  Extremities: no clubbing, cyanosis, or edema.    Please note that this dictation was created using voice recognition software. I have made every reasonable attempt to correct obvious errors, but I expect that there are errors of grammar and possibly content that I did not discover before finalizing the note.    Assessment/Plan:  1. Tinea corporis  nystatin (MYCOSTATIN) 354133 UNIT/GM Cream topical cream   2. Episodic lightheadedness     Patient has labs ordered from her primary care.  I recommended that she have those done sooner than scheduled, go to the emergency room for any continuing or worsening of symptoms concerning her lightheadedness.    Followup with primary " care in the next 7-10 days if not significantly improving, return to the urgent care or go to the emergency room sooner for any worsening of symptoms.

## 2018-07-05 ENCOUNTER — TELEPHONE (OUTPATIENT)
Dept: URGENT CARE | Facility: PHYSICIAN GROUP | Age: 68
End: 2018-07-05

## 2018-09-07 ENCOUNTER — HOSPITAL ENCOUNTER (OUTPATIENT)
Dept: RADIOLOGY | Facility: MEDICAL CENTER | Age: 68
DRG: 473 | End: 2018-09-07
Attending: NEUROLOGICAL SURGERY | Admitting: NEUROLOGICAL SURGERY
Payer: MEDICARE

## 2018-09-07 DIAGNOSIS — Z01.811 PRE-OPERATIVE RESPIRATORY EXAMINATION: ICD-10-CM

## 2018-09-07 DIAGNOSIS — Z01.810 PRE-OPERATIVE CARDIOVASCULAR EXAMINATION: ICD-10-CM

## 2018-09-07 DIAGNOSIS — Z01.812 PRE-OPERATIVE LABORATORY EXAMINATION: ICD-10-CM

## 2018-09-07 LAB
ANION GAP SERPL CALC-SCNC: 10 MMOL/L (ref 0–11.9)
APPEARANCE UR: CLEAR
APTT PPP: 27.3 SEC (ref 24.7–36)
BACTERIA #/AREA URNS HPF: NEGATIVE /HPF
BASOPHILS # BLD AUTO: 0.3 % (ref 0–1.8)
BASOPHILS # BLD: 0.03 K/UL (ref 0–0.12)
BILIRUB UR QL STRIP.AUTO: NEGATIVE
BUN SERPL-MCNC: 27 MG/DL (ref 8–22)
CALCIUM SERPL-MCNC: 9.8 MG/DL (ref 8.5–10.5)
CHLORIDE SERPL-SCNC: 106 MMOL/L (ref 96–112)
CO2 SERPL-SCNC: 28 MMOL/L (ref 20–33)
COLOR UR: YELLOW
CREAT SERPL-MCNC: 1.21 MG/DL (ref 0.5–1.4)
EKG IMPRESSION: NORMAL
EOSINOPHIL # BLD AUTO: 0.4 K/UL (ref 0–0.51)
EOSINOPHIL NFR BLD: 4.7 % (ref 0–6.9)
EPI CELLS #/AREA URNS HPF: ABNORMAL /HPF
ERYTHROCYTE [DISTWIDTH] IN BLOOD BY AUTOMATED COUNT: 46.6 FL (ref 35.9–50)
GLUCOSE SERPL-MCNC: 101 MG/DL (ref 65–99)
GLUCOSE UR STRIP.AUTO-MCNC: NEGATIVE MG/DL
HCT VFR BLD AUTO: 39.9 % (ref 37–47)
HGB BLD-MCNC: 13.2 G/DL (ref 12–16)
HYALINE CASTS #/AREA URNS LPF: ABNORMAL /LPF
IMM GRANULOCYTES # BLD AUTO: 0.02 K/UL (ref 0–0.11)
IMM GRANULOCYTES NFR BLD AUTO: 0.2 % (ref 0–0.9)
INR PPP: 0.98 (ref 0.87–1.13)
KETONES UR STRIP.AUTO-MCNC: NEGATIVE MG/DL
LEUKOCYTE ESTERASE UR QL STRIP.AUTO: ABNORMAL
LYMPHOCYTES # BLD AUTO: 1.62 K/UL (ref 1–4.8)
LYMPHOCYTES NFR BLD: 18.8 % (ref 22–41)
MCH RBC QN AUTO: 29.7 PG (ref 27–33)
MCHC RBC AUTO-ENTMCNC: 33.1 G/DL (ref 33.6–35)
MCV RBC AUTO: 89.7 FL (ref 81.4–97.8)
MICRO URNS: ABNORMAL
MONOCYTES # BLD AUTO: 0.58 K/UL (ref 0–0.85)
MONOCYTES NFR BLD AUTO: 6.7 % (ref 0–13.4)
NEUTROPHILS # BLD AUTO: 5.95 K/UL (ref 2–7.15)
NEUTROPHILS NFR BLD: 69.3 % (ref 44–72)
NITRITE UR QL STRIP.AUTO: NEGATIVE
NRBC # BLD AUTO: 0 K/UL
NRBC BLD-RTO: 0 /100 WBC
PH UR STRIP.AUTO: 6.5 [PH]
PLATELET # BLD AUTO: 223 K/UL (ref 164–446)
PMV BLD AUTO: 11.6 FL (ref 9–12.9)
POTASSIUM SERPL-SCNC: 3.5 MMOL/L (ref 3.6–5.5)
PROT UR QL STRIP: NEGATIVE MG/DL
PROTHROMBIN TIME: 12.7 SEC (ref 12–14.6)
RBC # BLD AUTO: 4.45 M/UL (ref 4.2–5.4)
RBC UR QL AUTO: NEGATIVE
SODIUM SERPL-SCNC: 144 MMOL/L (ref 135–145)
SP GR UR STRIP.AUTO: 1.02
UROBILINOGEN UR STRIP.AUTO-MCNC: 1 MG/DL
WBC # BLD AUTO: 8.6 K/UL (ref 4.8–10.8)
WBC #/AREA URNS HPF: ABNORMAL /HPF

## 2018-09-07 PROCEDURE — 36415 COLL VENOUS BLD VENIPUNCTURE: CPT

## 2018-09-07 PROCEDURE — 85730 THROMBOPLASTIN TIME PARTIAL: CPT

## 2018-09-07 PROCEDURE — 93010 ELECTROCARDIOGRAM REPORT: CPT | Performed by: INTERNAL MEDICINE

## 2018-09-07 PROCEDURE — 71046 X-RAY EXAM CHEST 2 VIEWS: CPT

## 2018-09-07 PROCEDURE — 85025 COMPLETE CBC W/AUTO DIFF WBC: CPT

## 2018-09-07 PROCEDURE — 93005 ELECTROCARDIOGRAM TRACING: CPT

## 2018-09-07 PROCEDURE — 81001 URINALYSIS AUTO W/SCOPE: CPT

## 2018-09-07 PROCEDURE — 85610 PROTHROMBIN TIME: CPT

## 2018-09-07 PROCEDURE — 80048 BASIC METABOLIC PNL TOTAL CA: CPT

## 2018-09-07 RX ORDER — ACYCLOVIR 400 MG/1
400 TABLET ORAL DAILY
COMMUNITY
End: 2020-05-29

## 2018-09-10 ENCOUNTER — APPOINTMENT (OUTPATIENT)
Dept: RADIOLOGY | Facility: MEDICAL CENTER | Age: 68
DRG: 473 | End: 2018-09-10
Attending: NEUROLOGICAL SURGERY
Payer: MEDICARE

## 2018-09-10 ENCOUNTER — HOSPITAL ENCOUNTER (INPATIENT)
Facility: MEDICAL CENTER | Age: 68
LOS: 1 days | DRG: 473 | End: 2018-09-11
Attending: NEUROLOGICAL SURGERY | Admitting: NEUROLOGICAL SURGERY
Payer: MEDICARE

## 2018-09-10 DIAGNOSIS — M48.02 CERVICAL STENOSIS OF SPINAL CANAL: ICD-10-CM

## 2018-09-10 DIAGNOSIS — M48.02 CERVICAL STENOSIS OF SPINE: ICD-10-CM

## 2018-09-10 PROCEDURE — 0RT30ZZ RESECTION OF CERVICAL VERTEBRAL DISC, OPEN APPROACH: ICD-10-PCS | Performed by: NEUROLOGICAL SURGERY

## 2018-09-10 PROCEDURE — 700112 HCHG RX REV CODE 229: Performed by: PHYSICIAN ASSISTANT

## 2018-09-10 PROCEDURE — 700101 HCHG RX REV CODE 250

## 2018-09-10 PROCEDURE — 501838 HCHG SUTURE GENERAL: Performed by: NEUROLOGICAL SURGERY

## 2018-09-10 PROCEDURE — 160041 HCHG SURGERY MINUTES - EA ADDL 1 MIN LEVEL 4: Performed by: NEUROLOGICAL SURGERY

## 2018-09-10 PROCEDURE — 700102 HCHG RX REV CODE 250 W/ 637 OVERRIDE(OP)

## 2018-09-10 PROCEDURE — A9270 NON-COVERED ITEM OR SERVICE: HCPCS | Performed by: PHYSICIAN ASSISTANT

## 2018-09-10 PROCEDURE — C1713 ANCHOR/SCREW BN/BN,TIS/BN: HCPCS | Performed by: NEUROLOGICAL SURGERY

## 2018-09-10 PROCEDURE — 160002 HCHG RECOVERY MINUTES (STAT): Performed by: NEUROLOGICAL SURGERY

## 2018-09-10 PROCEDURE — 160009 HCHG ANES TIME/MIN: Performed by: NEUROLOGICAL SURGERY

## 2018-09-10 PROCEDURE — 700111 HCHG RX REV CODE 636 W/ 250 OVERRIDE (IP): Performed by: PHYSICIAN ASSISTANT

## 2018-09-10 PROCEDURE — 72040 X-RAY EXAM NECK SPINE 2-3 VW: CPT

## 2018-09-10 PROCEDURE — 160036 HCHG PACU - EA ADDL 30 MINS PHASE I: Performed by: NEUROLOGICAL SURGERY

## 2018-09-10 PROCEDURE — 770001 HCHG ROOM/CARE - MED/SURG/GYN PRIV*

## 2018-09-10 PROCEDURE — 160029 HCHG SURGERY MINUTES - 1ST 30 MINS LEVEL 4: Performed by: NEUROLOGICAL SURGERY

## 2018-09-10 PROCEDURE — A6402 STERILE GAUZE <= 16 SQ IN: HCPCS | Performed by: NEUROLOGICAL SURGERY

## 2018-09-10 PROCEDURE — 160048 HCHG OR STATISTICAL LEVEL 1-5: Performed by: NEUROLOGICAL SURGERY

## 2018-09-10 PROCEDURE — 700111 HCHG RX REV CODE 636 W/ 250 OVERRIDE (IP)

## 2018-09-10 PROCEDURE — 502000 HCHG MISC OR IMPLANTS RC 0278: Performed by: NEUROLOGICAL SURGERY

## 2018-09-10 PROCEDURE — 01N10ZZ RELEASE CERVICAL NERVE, OPEN APPROACH: ICD-10-PCS | Performed by: NEUROLOGICAL SURGERY

## 2018-09-10 PROCEDURE — 500864 HCHG NEEDLE, SPINAL 18G: Performed by: NEUROLOGICAL SURGERY

## 2018-09-10 PROCEDURE — 110454 HCHG SHELL REV 250: Performed by: NEUROLOGICAL SURGERY

## 2018-09-10 PROCEDURE — 500445 HCHG HEMOSTAT, SURGICEL 4X8: Performed by: NEUROLOGICAL SURGERY

## 2018-09-10 PROCEDURE — 160035 HCHG PACU - 1ST 60 MINS PHASE I: Performed by: NEUROLOGICAL SURGERY

## 2018-09-10 PROCEDURE — 700102 HCHG RX REV CODE 250 W/ 637 OVERRIDE(OP): Performed by: PHYSICIAN ASSISTANT

## 2018-09-10 PROCEDURE — 700105 HCHG RX REV CODE 258: Performed by: PHYSICIAN ASSISTANT

## 2018-09-10 PROCEDURE — 0RG2070 FUSION OF 2 OR MORE CERVICAL VERTEBRAL JOINTS WITH AUTOLOGOUS TISSUE SUBSTITUTE, ANTERIOR APPROACH, ANTERIOR COLUMN, OPEN APPROACH: ICD-10-PCS | Performed by: NEUROLOGICAL SURGERY

## 2018-09-10 PROCEDURE — A9270 NON-COVERED ITEM OR SERVICE: HCPCS

## 2018-09-10 PROCEDURE — 700101 HCHG RX REV CODE 250: Performed by: PHYSICIAN ASSISTANT

## 2018-09-10 DEVICE — GRAFT ACTIFUSE ABX PUTTY 1.5ML: Type: IMPLANTABLE DEVICE | Status: FUNCTIONAL

## 2018-09-10 RX ORDER — BACITRACIN 50000 [IU]/1
INJECTION, POWDER, FOR SOLUTION INTRAMUSCULAR
Status: DISCONTINUED | OUTPATIENT
Start: 2018-09-10 | End: 2018-09-10 | Stop reason: HOSPADM

## 2018-09-10 RX ORDER — DOCUSATE SODIUM 100 MG/1
100 CAPSULE, LIQUID FILLED ORAL 2 TIMES DAILY
Status: DISCONTINUED | OUTPATIENT
Start: 2018-09-10 | End: 2018-09-11 | Stop reason: HOSPADM

## 2018-09-10 RX ORDER — HYDROMORPHONE HYDROCHLORIDE 2 MG/ML
INJECTION, SOLUTION INTRAMUSCULAR; INTRAVENOUS; SUBCUTANEOUS
Status: COMPLETED
Start: 2018-09-10 | End: 2018-09-10

## 2018-09-10 RX ORDER — CYCLOBENZAPRINE HCL 10 MG
10 TABLET ORAL EVERY 8 HOURS PRN
Status: DISCONTINUED | OUTPATIENT
Start: 2018-09-10 | End: 2018-09-11 | Stop reason: HOSPADM

## 2018-09-10 RX ORDER — ENEMA 19; 7 G/133ML; G/133ML
1 ENEMA RECTAL
Status: DISCONTINUED | OUTPATIENT
Start: 2018-09-10 | End: 2018-09-11 | Stop reason: HOSPADM

## 2018-09-10 RX ORDER — MIDAZOLAM HYDROCHLORIDE 1 MG/ML
INJECTION INTRAMUSCULAR; INTRAVENOUS
Status: DISPENSED
Start: 2018-09-10 | End: 2018-09-11

## 2018-09-10 RX ORDER — CLINDAMYCIN PHOSPHATE 900 MG/50ML
900 INJECTION, SOLUTION INTRAVENOUS EVERY 8 HOURS
Status: COMPLETED | OUTPATIENT
Start: 2018-09-10 | End: 2018-09-11

## 2018-09-10 RX ORDER — OXYCODONE HYDROCHLORIDE 5 MG/1
5 TABLET ORAL
Status: DISCONTINUED | OUTPATIENT
Start: 2018-09-10 | End: 2018-09-11 | Stop reason: HOSPADM

## 2018-09-10 RX ORDER — OXYCODONE HCL 5 MG/5 ML
SOLUTION, ORAL ORAL
Status: DISPENSED
Start: 2018-09-10 | End: 2018-09-11

## 2018-09-10 RX ORDER — DIPHENHYDRAMINE HCL 25 MG
25 TABLET ORAL EVERY 6 HOURS PRN
Status: DISCONTINUED | OUTPATIENT
Start: 2018-09-10 | End: 2018-09-11 | Stop reason: HOSPADM

## 2018-09-10 RX ORDER — CALCIUM CARBONATE 500 MG/1
500 TABLET, CHEWABLE ORAL 2 TIMES DAILY
Status: DISCONTINUED | OUTPATIENT
Start: 2018-09-10 | End: 2018-09-11 | Stop reason: HOSPADM

## 2018-09-10 RX ORDER — BUPIVACAINE HYDROCHLORIDE AND EPINEPHRINE 5; 5 MG/ML; UG/ML
INJECTION, SOLUTION PERINEURAL
Status: DISCONTINUED | OUTPATIENT
Start: 2018-09-10 | End: 2018-09-10 | Stop reason: HOSPADM

## 2018-09-10 RX ORDER — BISACODYL 10 MG
10 SUPPOSITORY, RECTAL RECTAL
Status: DISCONTINUED | OUTPATIENT
Start: 2018-09-10 | End: 2018-09-11 | Stop reason: HOSPADM

## 2018-09-10 RX ORDER — SODIUM CHLORIDE 9 MG/ML
INJECTION, SOLUTION INTRAVENOUS CONTINUOUS
Status: DISCONTINUED | OUTPATIENT
Start: 2018-09-10 | End: 2018-09-11 | Stop reason: HOSPADM

## 2018-09-10 RX ORDER — ONDANSETRON 2 MG/ML
4 INJECTION INTRAMUSCULAR; INTRAVENOUS EVERY 4 HOURS PRN
Status: DISCONTINUED | OUTPATIENT
Start: 2018-09-10 | End: 2018-09-11 | Stop reason: HOSPADM

## 2018-09-10 RX ORDER — LISINOPRIL AND HYDROCHLOROTHIAZIDE 25; 20 MG/1; MG/1
1 TABLET ORAL DAILY
Status: DISCONTINUED | OUTPATIENT
Start: 2018-09-11 | End: 2018-09-10

## 2018-09-10 RX ORDER — LISINOPRIL 20 MG/1
20 TABLET ORAL
Status: DISCONTINUED | OUTPATIENT
Start: 2018-09-10 | End: 2018-09-11 | Stop reason: HOSPADM

## 2018-09-10 RX ORDER — OXYCODONE HYDROCHLORIDE 10 MG/1
10 TABLET ORAL
Status: DISCONTINUED | OUTPATIENT
Start: 2018-09-10 | End: 2018-09-11 | Stop reason: HOSPADM

## 2018-09-10 RX ORDER — AMOXICILLIN 250 MG
1 CAPSULE ORAL NIGHTLY
Status: DISCONTINUED | OUTPATIENT
Start: 2018-09-10 | End: 2018-09-11 | Stop reason: HOSPADM

## 2018-09-10 RX ORDER — HYDROCHLOROTHIAZIDE 25 MG/1
25 TABLET ORAL
Status: DISCONTINUED | OUTPATIENT
Start: 2018-09-10 | End: 2018-09-11 | Stop reason: HOSPADM

## 2018-09-10 RX ORDER — HYDROMORPHONE HYDROCHLORIDE 2 MG/ML
0.5 INJECTION, SOLUTION INTRAMUSCULAR; INTRAVENOUS; SUBCUTANEOUS
Status: DISCONTINUED | OUTPATIENT
Start: 2018-09-10 | End: 2018-09-11 | Stop reason: HOSPADM

## 2018-09-10 RX ORDER — ALLOPURINOL 100 MG/1
100 TABLET ORAL 2 TIMES DAILY
Status: DISCONTINUED | OUTPATIENT
Start: 2018-09-10 | End: 2018-09-11 | Stop reason: HOSPADM

## 2018-09-10 RX ORDER — AMOXICILLIN 250 MG
1 CAPSULE ORAL
Status: DISCONTINUED | OUTPATIENT
Start: 2018-09-10 | End: 2018-09-11 | Stop reason: HOSPADM

## 2018-09-10 RX ORDER — LOVASTATIN 20 MG/1
10 TABLET ORAL DAILY
Status: DISCONTINUED | OUTPATIENT
Start: 2018-09-10 | End: 2018-09-11 | Stop reason: HOSPADM

## 2018-09-10 RX ORDER — ACYCLOVIR 800 MG/1
400 TABLET ORAL DAILY
Status: DISCONTINUED | OUTPATIENT
Start: 2018-09-10 | End: 2018-09-11 | Stop reason: HOSPADM

## 2018-09-10 RX ORDER — ESCITALOPRAM OXALATE 10 MG/1
10 TABLET ORAL DAILY
Status: DISCONTINUED | OUTPATIENT
Start: 2018-09-11 | End: 2018-09-11 | Stop reason: HOSPADM

## 2018-09-10 RX ORDER — POLYETHYLENE GLYCOL 3350 17 G/17G
1 POWDER, FOR SOLUTION ORAL 2 TIMES DAILY PRN
Status: DISCONTINUED | OUTPATIENT
Start: 2018-09-10 | End: 2018-09-11 | Stop reason: HOSPADM

## 2018-09-10 RX ORDER — DEXAMETHASONE SODIUM PHOSPHATE 4 MG/ML
4 INJECTION, SOLUTION INTRA-ARTICULAR; INTRALESIONAL; INTRAMUSCULAR; INTRAVENOUS; SOFT TISSUE EVERY 6 HOURS
Status: DISCONTINUED | OUTPATIENT
Start: 2018-09-11 | End: 2018-09-11 | Stop reason: HOSPADM

## 2018-09-10 RX ORDER — ONDANSETRON 4 MG/1
4 TABLET, ORALLY DISINTEGRATING ORAL EVERY 4 HOURS PRN
Status: DISCONTINUED | OUTPATIENT
Start: 2018-09-10 | End: 2018-09-11 | Stop reason: HOSPADM

## 2018-09-10 RX ORDER — DIPHENHYDRAMINE HYDROCHLORIDE 50 MG/ML
25 INJECTION INTRAMUSCULAR; INTRAVENOUS EVERY 6 HOURS PRN
Status: DISCONTINUED | OUTPATIENT
Start: 2018-09-10 | End: 2018-09-11 | Stop reason: HOSPADM

## 2018-09-10 RX ORDER — SODIUM CHLORIDE, SODIUM LACTATE, POTASSIUM CHLORIDE, CALCIUM CHLORIDE 600; 310; 30; 20 MG/100ML; MG/100ML; MG/100ML; MG/100ML
INJECTION, SOLUTION INTRAVENOUS CONTINUOUS
Status: DISCONTINUED | OUTPATIENT
Start: 2018-09-10 | End: 2018-09-11 | Stop reason: HOSPADM

## 2018-09-10 RX ADMIN — HYDROMORPHONE HYDROCHLORIDE 0.5 MG: 2 INJECTION INTRAMUSCULAR; INTRAVENOUS; SUBCUTANEOUS at 19:12

## 2018-09-10 RX ADMIN — HYDROCODONE BITARTRATE AND ACETAMINOPHEN 30 ML: 2.5; 108 SOLUTION ORAL at 18:53

## 2018-09-10 RX ADMIN — DEXAMETHASONE SODIUM PHOSPHATE 4 MG: 4 INJECTION, SOLUTION INTRAMUSCULAR; INTRAVENOUS at 23:22

## 2018-09-10 RX ADMIN — HYDROMORPHONE HYDROCHLORIDE 0.5 MG: 2 INJECTION INTRAMUSCULAR; INTRAVENOUS; SUBCUTANEOUS at 19:17

## 2018-09-10 RX ADMIN — ACYCLOVIR 400 MG: 800 TABLET ORAL at 23:22

## 2018-09-10 RX ADMIN — ALLOPURINOL 100 MG: 100 TABLET ORAL at 21:07

## 2018-09-10 RX ADMIN — SENNOSIDES AND DOCUSATE SODIUM 1 TABLET: 8.6; 5 TABLET ORAL at 21:07

## 2018-09-10 RX ADMIN — DOCUSATE SODIUM 100 MG: 100 CAPSULE, LIQUID FILLED ORAL at 21:07

## 2018-09-10 RX ADMIN — CLINDAMYCIN IN 5 PERCENT DEXTROSE 900 MG: 18 INJECTION, SOLUTION INTRAVENOUS at 23:22

## 2018-09-10 RX ADMIN — FENTANYL CITRATE 50 MCG: 50 INJECTION, SOLUTION INTRAMUSCULAR; INTRAVENOUS at 18:49

## 2018-09-10 RX ADMIN — SODIUM CHLORIDE, SODIUM LACTATE, POTASSIUM CHLORIDE, CALCIUM CHLORIDE: 600; 310; 30; 20 INJECTION, SOLUTION INTRAVENOUS at 15:08

## 2018-09-10 RX ADMIN — ANTACID TABLETS 500 MG: 500 TABLET, CHEWABLE ORAL at 21:07

## 2018-09-10 RX ADMIN — SODIUM CHLORIDE: 9 INJECTION, SOLUTION INTRAVENOUS at 21:05

## 2018-09-10 RX ADMIN — FENTANYL CITRATE 50 MCG: 50 INJECTION, SOLUTION INTRAMUSCULAR; INTRAVENOUS at 18:53

## 2018-09-10 ASSESSMENT — PAIN SCALES - GENERAL
PAINLEVEL_OUTOF10: 3
PAINLEVEL_OUTOF10: 8
PAINLEVEL_OUTOF10: 9
PAINLEVEL_OUTOF10: 10
PAINLEVEL_OUTOF10: 2
PAINLEVEL_OUTOF10: 3
PAINLEVEL_OUTOF10: 3
PAINLEVEL_OUTOF10: 0

## 2018-09-10 NOTE — OR NURSING
Pre op complete. Educated pt on use of the call light. Family present at bedside. Rounding in place.

## 2018-09-11 VITALS
HEIGHT: 61 IN | DIASTOLIC BLOOD PRESSURE: 59 MMHG | WEIGHT: 205.91 LBS | HEART RATE: 73 BPM | RESPIRATION RATE: 20 BRPM | BODY MASS INDEX: 38.88 KG/M2 | OXYGEN SATURATION: 96 % | SYSTOLIC BLOOD PRESSURE: 137 MMHG | TEMPERATURE: 98 F

## 2018-09-11 PROCEDURE — G8978 MOBILITY CURRENT STATUS: HCPCS | Mod: CI

## 2018-09-11 PROCEDURE — G8980 MOBILITY D/C STATUS: HCPCS | Mod: CI

## 2018-09-11 PROCEDURE — 700111 HCHG RX REV CODE 636 W/ 250 OVERRIDE (IP): Performed by: PHYSICIAN ASSISTANT

## 2018-09-11 PROCEDURE — 700102 HCHG RX REV CODE 250 W/ 637 OVERRIDE(OP): Performed by: PHYSICIAN ASSISTANT

## 2018-09-11 PROCEDURE — A9270 NON-COVERED ITEM OR SERVICE: HCPCS | Performed by: PHYSICIAN ASSISTANT

## 2018-09-11 PROCEDURE — 700112 HCHG RX REV CODE 229: Performed by: PHYSICIAN ASSISTANT

## 2018-09-11 PROCEDURE — 97161 PT EVAL LOW COMPLEX 20 MIN: CPT

## 2018-09-11 PROCEDURE — G8979 MOBILITY GOAL STATUS: HCPCS | Mod: CI

## 2018-09-11 PROCEDURE — 700101 HCHG RX REV CODE 250: Performed by: PHYSICIAN ASSISTANT

## 2018-09-11 RX ORDER — CYCLOBENZAPRINE HCL 10 MG
10 TABLET ORAL 3 TIMES DAILY PRN
Qty: 90 TAB | Refills: 0 | Status: SHIPPED | OUTPATIENT
Start: 2018-09-11 | End: 2018-10-11

## 2018-09-11 RX ORDER — METHYLPREDNISOLONE 4 MG/1
TABLET ORAL
Qty: 1 KIT | Refills: 0 | Status: SHIPPED | OUTPATIENT
Start: 2018-09-11 | End: 2018-10-10

## 2018-09-11 RX ORDER — OXYCODONE HYDROCHLORIDE 5 MG/1
5 TABLET ORAL EVERY 4 HOURS PRN
Qty: 84 TAB | Refills: 0 | Status: SHIPPED | OUTPATIENT
Start: 2018-09-11 | End: 2018-09-25

## 2018-09-11 RX ADMIN — LISINOPRIL 20 MG: 20 TABLET ORAL at 00:38

## 2018-09-11 RX ADMIN — DOCUSATE SODIUM 100 MG: 100 CAPSULE, LIQUID FILLED ORAL at 04:15

## 2018-09-11 RX ADMIN — ALLOPURINOL 100 MG: 100 TABLET ORAL at 08:39

## 2018-09-11 RX ADMIN — LOVASTATIN 10 MG: 20 TABLET ORAL at 00:38

## 2018-09-11 RX ADMIN — HYDROCHLOROTHIAZIDE 25 MG: 25 TABLET ORAL at 00:38

## 2018-09-11 RX ADMIN — DEXAMETHASONE SODIUM PHOSPHATE 4 MG: 4 INJECTION, SOLUTION INTRAMUSCULAR; INTRAVENOUS at 04:15

## 2018-09-11 RX ADMIN — ESCITALOPRAM OXALATE 10 MG: 10 TABLET ORAL at 04:15

## 2018-09-11 RX ADMIN — ANTACID TABLETS 500 MG: 500 TABLET, CHEWABLE ORAL at 04:15

## 2018-09-11 RX ADMIN — CLINDAMYCIN IN 5 PERCENT DEXTROSE 900 MG: 18 INJECTION, SOLUTION INTRAVENOUS at 04:15

## 2018-09-11 ASSESSMENT — COGNITIVE AND FUNCTIONAL STATUS - GENERAL
EATING MEALS: A LITTLE
STANDING UP FROM CHAIR USING ARMS: A LITTLE
TOILETING: A LITTLE
DRESSING REGULAR LOWER BODY CLOTHING: A LITTLE
CLIMB 3 TO 5 STEPS WITH RAILING: A LITTLE
WALKING IN HOSPITAL ROOM: A LOT
MOVING FROM LYING ON BACK TO SITTING ON SIDE OF FLAT BED: A LITTLE
MOBILITY SCORE: 16
MOVING TO AND FROM BED TO CHAIR: A LITTLE
STANDING UP FROM CHAIR USING ARMS: A LITTLE
CLIMB 3 TO 5 STEPS WITH RAILING: A LOT
SUGGESTED CMS G CODE MODIFIER DAILY ACTIVITY: CK
PERSONAL GROOMING: A LITTLE
CLIMB 3 TO 5 STEPS WITH RAILING: A LOT
DAILY ACTIVITIY SCORE: 18
TOILETING: A LITTLE
WALKING IN HOSPITAL ROOM: A LITTLE
STANDING UP FROM CHAIR USING ARMS: A LITTLE
SUGGESTED CMS G CODE MODIFIER MOBILITY: CK
PERSONAL GROOMING: A LITTLE
SUGGESTED CMS G CODE MODIFIER MOBILITY: CK
SUGGESTED CMS G CODE MODIFIER MOBILITY: CK
TURNING FROM BACK TO SIDE WHILE IN FLAT BAD: A LITTLE
MOBILITY SCORE: 16
WALKING IN HOSPITAL ROOM: A LOT
MOVING FROM LYING ON BACK TO SITTING ON SIDE OF FLAT BED: A LITTLE
TURNING FROM BACK TO SIDE WHILE IN FLAT BAD: A LITTLE
HELP NEEDED FOR BATHING: A LITTLE
MOVING TO AND FROM BED TO CHAIR: A LITTLE
TURNING FROM BACK TO SIDE WHILE IN FLAT BAD: A LITTLE
DRESSING REGULAR UPPER BODY CLOTHING: A LITTLE
MOBILITY SCORE: 18
MOVING FROM LYING ON BACK TO SITTING ON SIDE OF FLAT BED: A LITTLE
HELP NEEDED FOR BATHING: A LITTLE
MOVING TO AND FROM BED TO CHAIR: A LITTLE

## 2018-09-11 ASSESSMENT — GAIT ASSESSMENTS
DEVIATION: BRADYKINETIC
GAIT LEVEL OF ASSIST: SUPERVISED
DISTANCE (FEET): 150

## 2018-09-11 ASSESSMENT — PATIENT HEALTH QUESTIONNAIRE - PHQ9
1. LITTLE INTEREST OR PLEASURE IN DOING THINGS: NOT AT ALL
2. FEELING DOWN, DEPRESSED, IRRITABLE, OR HOPELESS: NOT AT ALL
SUM OF ALL RESPONSES TO PHQ9 QUESTIONS 1 AND 2: 0

## 2018-09-11 ASSESSMENT — PAIN SCALES - GENERAL
PAINLEVEL_OUTOF10: 2
PAINLEVEL_OUTOF10: 2

## 2018-09-11 ASSESSMENT — LIFESTYLE VARIABLES: ALCOHOL_USE: NO

## 2018-09-11 NOTE — PROGRESS NOTES
Patient AAOx4, moves all extremities x4, neuro assessment has remained WDL, tolerating sips of po without n/v, pain well controlled. Family updated on plan of care and approximate time of arrival to room. Awaiting ortho tech to apply cervical collar.

## 2018-09-11 NOTE — DISCHARGE SUMMARY
DATE OF ADMISSION:  09/10/2018    DATE OF DISCHARGE:  09/11/2018    ADMISSION DIAGNOSES:  1.  Cervical stenosis with radiculopathy at C4-C5 and C5-C6.  2.  Cervical degenerative disk disease.  3.  Failure of conservative measures.    DISCHARGE DIAGNOSES:  1.  Cervical stenosis with radiculopathy at C4-C5 and C5-C6.  2.  Cervical degenerative disk disease.  3.  Failure of conservative measures.  4.  Status post C4-C5, C5-C6 anterior cervical diskectomy and fusion with   anterior plating on the day of admission.    HOSPITAL COURSE:  The patient is a pleasant 68-year-old female who presented   to [facility] with right upper extremity weakness and pain, radicular pain   and an MRI that showed cervical degenerative disk disease from C4 to C6.  Due   to her progressive neurologic symptoms and MRI findings, surgery was indicated   for the decompression of her central canal neural foramen.  For details of   the surgery, please see Dr. Hernandez's operative report.  Postoperatively, she   was mobilized with physical therapy and occupational therapy.  Her pain was   controlled with oral and IV analgesia.  Her drain output remained minimal.    She reported improvement in pain.  Her incision remained clean, dry, and   intact with no evidence of seroma or hematoma.  She is eager to be discharged   to home.    DISCHARGE INSTRUCTIONS:  Follow up with [facility] as previously arranged.    No lifting greater than 5 pounds.  Ice to the incision frequently.  Stool   softeners p.r.n.  Take pain medication as prescribed.  No driving.  Keep the   incision dry for 4 days postoperatively.  May resume nonsteroidal   anti-inflammatories in 10 days' time.    DISCHARGE PRESCRIPTIONS:  Include Percocet 5/325 one tablet p.o. q. 4 hours   p.r.n. pain, #84, no refills; Robaxin 750 mg 1 p.o. q. 8 hours p.r.n. spasm,   #90, no refills; Medrol Dosepak to take as directed with Pepcid 20 mg p.o.   b.i.d.    The above represents a brief summary of  this patient's hospital stay.  For   details, please see the medical chart.       ____________________________________     NICOLA Alex / JENNA    DD:  09/11/2018 08:35:34  DT:  09/11/2018 10:12:03    D#:  [id number]  Job#:  [id number]

## 2018-09-11 NOTE — PROGRESS NOTES
"Pt arrived to room via transport. 2 RN skin check complete -- skin looks good, no issues to note.    Pt A/Ox4. Pt has an IV present and patent. ELIS drain present compressed to suction. Pt stating she has 2/10 neck pain described as \"aching\". Pt has a soft neck collar on. Pt has family at bedside. Pt was unable to walk to the bed from the rMayer and has a bout of incontinence. Pt states she is not regularly incontinent. Pt has bed alarm on, bed locked and in lowest position. Call light and belongings within reach.  "

## 2018-09-11 NOTE — PROGRESS NOTES
Fitted patient with 3'' soft collar  If you have any questions or concerns please call traction at [phone]

## 2018-09-11 NOTE — PROGRESS NOTES
Neurosurgery Progress Note    Subjective:  POD#1 S/p  ACDF C4-6   Doing well with improved Arm pain and swallowing liquids well.    Hasn't tried Solids yet.  Will have breakfast shortly   Wound healing well and drain out.  Has ambulated to BR well.  Wants to go home and has daughter at home (CNA) to help.    Exam:  A&O x 3  Afebrile  Strebgth 5/5  DTRs +2  Sensory intact.  Wound C/D/I    BP  Min: 128/54  Max: 145/62  Pulse  Av.6  Min: 66  Max: 86  Resp  Avg: 15.9  Min: 13  Max: 27  Temp  Av.4 °C (97.6 °F)  Min: 36.1 °C (97 °F)  Max: 37 °C (98.6 °F)  SpO2  Av.2 %  Min: 92 %  Max: 98 %    No Data Recorded                      Intake/Output       09/10/18 07 - 1859 18 07 - 1859       Total  [id number] Total       Intake    P.O.  --  240 240  --  -- --    P.O. -- 240 240 -- -- --    I.V.  --  1000 1000  --  -- --    IV Piggyback Volume (IV Piggyback) -- 100 100 -- -- --    IV Volume (< Enter Name Here >) -- 900 900 -- -- --    Total Intake -- 1240 1240 -- -- --       Output    Drains  30  25 55  --  -- --    Output (ml) ([REMOVED] [facility] Ne[facility] ) 30 25 55 -- -- --    Total Output 30 25 55 -- -- --       Net I/O     -30 1215 1185 -- -- --            Intake/Output Summary (Last 24 hours) at 18 0836  Last data filed at 18 0600   Gross per 24 hour   Intake             1240 ml   Output               55 ml   Net             1185 ml            • LR   Continuous   • acyclovir  400 mg DAILY   • allopurinol  100 mg BID   • escitalopram  10 mg DAILY   • lovastatin  10 mg DAILY   • Pharmacy Consult Request  1 Each PRN   • MD ALERT...DO NOT ADMINISTER NSAIDS or ASPIRIN unless ORDERED By Neurosurgery  1 Each PRN   • docusate sodium  100 mg BID   • senna-docusate  1 Tab Nightly   • senna-docusate  1 Tab Q24HRS PRN   • polyethylene glycol/lytes  1 Packet BID PRN   • magnesium hydroxide  30 mL QDAY PRN   • bisacodyl  10 mg Q24HRS PRN   • fleet   1 Each Once PRN   • NS   Continuous   • oxyCODONE immediate-release  5 mg Q3HRS PRN   • oxyCODONE immediate release  10 mg Q3HRS PRN   • HYDROmorphone  0.5 mg Q3HRS PRN   • diphenhydrAMINE  25 mg Q6HRS PRN    Or   • diphenhydrAMINE  25 mg Q6HRS PRN   • ondansetron  4 mg Q4HRS PRN   • ondansetron  4 mg Q4HRS PRN   • dexamethasone  4 mg Q6HRS   • cyclobenzaprine  10 mg Q8HRS PRN   • benzocaine-menthol  1 Lozenge Q2HRS PRN   • calcium carbonate  500 mg BID   • lisinopril  20 mg Q DAY    And   • hydroCHLOROthiazide  25 mg Q DAY       Assessment and Plan:  POD#1 S/p  ACDF C4-6   Feeling better and ambulating in room.  Swallowing liquids well-  Will see how she does wit solids at breakfast.  Will plan to go home unless difficulty swallowing.  Will d/c to home today wit 2 week f/u with Dr. Hernandez.  Call sooner if Sx worsen or Concerns arise.  [phone]  Activity restrictions and Wound care as per Pre-op instruction sheet.  May shower tomorrow.  Resume Home meds and add:              Percocet 5/325 1 tab POq4-6 hrs PRN Pain  #84  No RF.  14 days              Robaxin 750mg 1 tab PO  q8hrs PRN Spasm  #90  No RF  30 Days   Medrol dose pack

## 2018-09-11 NOTE — PROGRESS NOTES
Pt given discharge instructions and verbalized understanding. IV removed with no complications. Pt wheeled out by staff. Pt A&Ox4 at time of DC. All questions answered. Pt also signed and verbalized consent of opiates.

## 2018-09-11 NOTE — OR SURGEON
Immediate Post OP Note    PreOp Diagnosis: C4-5, C5-6 stenosis    PostOp Diagnosis: same    Procedure(s):  CERVICAL DISK AND FUSION ANTERIOR- C4-6 - Wound Class: Clean with Drain    Surgeon(s):  Sterling Hernandez M.D.    Anesthesiologist/Type of Anesthesia:  Anesthesiologist: Ck Patterson M.D./General    Surgical Staff:  Assistant: Edvin Pinto P.A.-C.  Circulator: Ginette Chauhan R.N.  Scrub Person: Winnie Pitt  Radiology Technologist: Lyla Corbett    Specimens removed if any:  * No specimens in log *    Estimated Blood Loss: <10cc    Findings: osteophyte formation C4-5, C5-6 with stenosis.    Complications: None.        9/10/2018 6:33 PM Edvin Pinto P.A.-C.

## 2018-09-11 NOTE — DISCHARGE INSTRUCTIONS
Discharge Instructions    Discharged to home by car with relative. Discharged via wheelchair, hospital escort: Yes.  Special equipment needed: Not Applicable    Be sure to schedule a follow-up appointment with your primary care doctor or any specialists as instructed.     Discharge Plan:   Diet Plan: Discussed  Activity Level: Discussed  Confirmed Follow up Appointment: Patient to Call and Schedule Appointment  Confirmed Symptoms Management: Discussed  Medication Reconciliation Updated: Yes  Pneumococcal Vaccine Administered/Refused: Not given - Patient refused pneumococcal vaccine  Influenza Vaccine Indication: Patient Refuses    I understand that a diet low in cholesterol, fat, and sodium is recommended for good health. Unless I have been given specific instructions below for another diet, I accept this instruction as my diet prescription.   Other diet: regular    Special Instructions: None    · Is patient discharged on Warfarin / Coumadin?   No     Depression / Suicide Risk    As you are discharged from this [facility] facility, it is important to learn how to keep safe from harming yourself.    Recognize the warning signs:  · Abrupt changes in personality, positive or negative- including increase in energy   · Giving away possessions  · Change in eating patterns- significant weight changes-  positive or negative  · Change in sleeping patterns- unable to sleep or sleeping all the time   · Unwillingness or inability to communicate  · Depression  · Unusual sadness, discouragement and loneliness  · Talk of wanting to die  · Neglect of personal appearance   · Rebelliousness- reckless behavior  · Withdrawal from people/activities they love  · Confusion- inability to concentrate     If you or a loved one observes any of these behaviors or has concerns about self-harm, here's what you can do:  · Talk about it- your feelings and reasons for harming yourself  · Remove any means that you might use to hurt yourself  (examples: pills, rope, extension cords, firearm)  · Get professional help from the community (Mental Health, Substance Abuse, psychological counseling)  · Do not be alone:Call your Safe Contact- someone whom you trust who will be there for you.  · Call your local CRISIS HOTLINE 043-5125 or 496-341-7483  · Call your local Children's Mobile Crisis Response Team [place] (803) 984-9356 or www.[employer]  · Call the toll free National Suicide Prevention Hotlines   · National Suicide Prevention Lifeline 523-808-WQCE (1243)  · [employer] Line Network 800-SUICIDE (073-2209)      2 week f/u with Dr. Hernandez.   Call sooner if Sx worsen or Concerns arise.  [phone]   Activity restrictions and Wound care as per Pre-op instruction sheet.   May shower tomorrow.   Resume Home meds and add:               Percocet 5/325 1 tab POq4-6 hrs PRN Pain  #84  No RF.  14 days               Flexeril 10mg 1 tab PO  q8hrs PRN Spasm  #90  No RF  30 Days

## 2018-09-11 NOTE — OP REPORT
Date: 9/10/2018     Surgeon: Sterling Hernandez MD     1st Asst.: Edvin Pinto PAC     Anesthesiologist: Ck Patterson MD    Preoperative diagnosis:  1. Cervical stenosis with radiculopathy C4-5  2. Cervical stenosis with radiculopathy C5-6  3. Cervical degenerative disc disease  4. Failure of conservative measures     Postoperative diagnosis:  1. Cervical stenosis with radiculopathy C4-5  2. Cervical stenosis with radiculopathy C5-6  3. Cervical degenerative disc disease  4. Failure of conservative measures     Procedures performed  1. Partial corpectomy C4  2. Partial corpectomy C5  3. Partial corpectomy C6  5. Cervical discectomy C4-5  6. Cervical discectomy C5-6  7. Placement of [place] anterior cervical intervertebral disc spacer, 7 mm at C4-5  8. Placement of [place] anterior cervical intervertebral disc spacer, 7 mm at C5-6  9. Placement of neuro structures, transom plate, anterior cervical plate from C4-C6 affixed to the anterior vertebral bodies with 14 mm self drilling screws ×6  10. Microscope microscopic dissection  11. [place] local autograft, same incision for the purposes of arthrodesis  12. Anterior cervical arthrodesis C4-6     Indication for procedure  This is a 68-year-old female who presented to my clinic with right upper extremity weakness and pain, radicular pain and an MRI that showed cervical degenerative disc disease from C4-C6. Due to her progressive neurologic symptoms and MRI findings, surgery was indicated for the decompression of her central canal, neural foramen from C4-C6 along with stabilization due to the severity of her spondylosis. She understood the risks, benefits, alternatives to the procedure and wished to proceed     Procedure in detail  Patient was brought to the operating room and intubated by the anesthesiologist. She was placed supine on a regular or table with a bump under her shoulders, her head in a doughnut and her shoulders gently fix the operating room table with tape. She  was marked, prepped, draped in usual sterile fashion. A preprocedure timeout was performed. 0.5% Marcaine with epinephrine was infiltrated the skin. A 10 blade was used to sharply incise the skin. Monopolar electrocautery was used to divide the platysma and created a subplatysmal plane both rostrally and caudally. This was extended with finger dissection. The medial border of sternocleidomastoid was noted and the fascia medial to this was opened with monopolar electrocautery. The omohyoid muscle, anterior belly was sectioned with monopolar electrocautery. The carotid sheath and its contents were mobilized laterally. Bipolar electrocautery was used for hemostasis. The prevertebral plane and fascia were noted. The prevertebral fascia was opened with monopolar electrocautery. The longus coli muscles were stripped from the anterolateral borders bilaterally at C4, C5, C6 with monopolar electrocautery. A spinal needle was placed in the C4-5 disc space. This was localized under fluoroscopy. Monopolar electrocautery was used to jay the disc space. A self-retaining retractor was placed. [place] pins were placed in the anterior vertebral bodies at C4, C5-C6. [place] pin distraction was applied at C4 and C5. Significant anterior arthrosis was present. In order to fully access the disc space at C4-5, partial corpectomies were needed both at C4 and C5, greater than 40%. The microscope was brought in the field complete the surgery. A 12 mm drilling bur was used to make generous corpectomies at C4 and C5. This proceeded down to the posterior longitudinal ligament. Bone chips were saved. An 8 mm drilling bur was used to remove the final osteophytes at C4 and C5. A 1 mm Kerr[place] was used to section the posterior longitudinal ligament and 2 mm Kerrison was used to resect the posterior longitudinal ligament. Generous foraminotomies were created over the bilateral C5 nerve roots with 2 mm Kerrison. Surgi-Ethan was placed in the epidural  space. A 7 mm trial was noted to fit the space well. A 7 mm graft was filled with locally harvested autograft from the partial corpectomies and a small amount of Actifuse as this was inserted into the C4-5 disc space. The [place] pin was removed from the vertebral body at C4 and avoid filled with Surgi-Ethan. Attention was then turned to C5-6. [place] pin distraction was applied at C5 and C6. Significant anterior arthrosis was present. In order to fully access the disc space at C5-6, partial corpectomies were needed both at C5 and C6, greater than 40%.  A 12 mm drilling bur was used to make generous corpectomies at C5 and C6. This proceeded down to the posterior longitudinal ligament. Bone chips were saved. An 8 mm drilling bur was used to remove the final osteophytes at C5 and C6. A 1 mm Kerrison was used to section the posterior longitudinal ligament and 2 mm Kerrison was used to resect the posterior longitudinal ligament. Generous foraminotomies were created over the bilateral C6 nerve roots with 2 mm Kerrison. Surgi-Ethan was placed in the epidural space. A 7 mm trial was noted to fit the space well. A 7 mm graft was filled with locally harvested autograft from the partial corpectomies and a small amount of Actifuse as this was inserted into C5-6 disc space. An anterior cervical plate, neuro structures, transom was affixed to the anterior vertebral bodies at C4, C5, C6 with 14 mm self drilling screws ×6. Secondary locking mechanisms were utilized. Final AP and lateral fluoroscopy confirmed good hardware placement and the appropriate surgical levels. The wound was copiously irrigated. Bipolar electrocautery was used for hemostasis. Although the wound was dry, I did place a drain in the prevertebral space, given the 2 level exposure. This was tunneled through separate stab incision and affixed to the skin. The wound was closed in layers. Steri-Strips are applied to the skin edges. A sterile dressing was applied. The  patient was extubated in the operating room and taken to PACU in stable condition     Complications  None noted     Estimated blood loss  25 mL

## 2018-09-12 ENCOUNTER — PATIENT OUTREACH (OUTPATIENT)
Dept: HEALTH INFORMATION MANAGEMENT | Facility: OTHER | Age: 68
End: 2018-09-12

## 2018-10-10 ENCOUNTER — OFFICE VISIT (OUTPATIENT)
Dept: MEDICAL GROUP | Facility: PHYSICIAN GROUP | Age: 68
End: 2018-10-10
Payer: MEDICARE

## 2018-10-10 VITALS
WEIGHT: 204 LBS | HEART RATE: 66 BPM | TEMPERATURE: 97.8 F | OXYGEN SATURATION: 97 % | SYSTOLIC BLOOD PRESSURE: 116 MMHG | RESPIRATION RATE: 18 BRPM | DIASTOLIC BLOOD PRESSURE: 66 MMHG | BODY MASS INDEX: 37.54 KG/M2 | HEIGHT: 62 IN

## 2018-10-10 DIAGNOSIS — K02.9 DENTAL CARIES: ICD-10-CM

## 2018-10-10 DIAGNOSIS — I50.30 CHF WITH LEFT VENTRICULAR DIASTOLIC DYSFUNCTION, NYHA CLASS 1 (HCC): ICD-10-CM

## 2018-10-10 DIAGNOSIS — R94.4 DECREASED GFR: ICD-10-CM

## 2018-10-10 DIAGNOSIS — Z23 NEEDS FLU SHOT: ICD-10-CM

## 2018-10-10 DIAGNOSIS — F34.1 DYSTHYMIA: ICD-10-CM

## 2018-10-10 DIAGNOSIS — M48.9 CERVICAL SPINE DISEASE: ICD-10-CM

## 2018-10-10 DIAGNOSIS — E66.9 OBESITY (BMI 35.0-39.9 WITHOUT COMORBIDITY): ICD-10-CM

## 2018-10-10 DIAGNOSIS — Z12.39 BREAST CANCER SCREENING: ICD-10-CM

## 2018-10-10 DIAGNOSIS — M1A.9XX1 CHRONIC GOUT WITH TOPHUS, UNSPECIFIED CAUSE, UNSPECIFIED SITE: Chronic | ICD-10-CM

## 2018-10-10 DIAGNOSIS — E78.00 HYPERCHOLESTEROLEMIA: ICD-10-CM

## 2018-10-10 DIAGNOSIS — I10 ESSENTIAL HYPERTENSION: ICD-10-CM

## 2018-10-10 PROCEDURE — 99214 OFFICE O/P EST MOD 30 MIN: CPT | Performed by: INTERNAL MEDICINE

## 2018-10-10 NOTE — PROGRESS NOTES
Chief Complaint   Patient presents with   • Hypertension     follow up       HISTORY OF PRESENT ILLNESS: Patient is a 68 y.o. female established patient who presents today to discuss the medical issues below.    Cervical spine disease  S/p surgical intervention, C4-C5, C5-C6 anterior cervical diskectomy and fusion with anterior plating. Aug 10, 2018 and is doing well.  She states she no longer has the shooting pains.  Some diffuse aching.  She has follow up with nuerusurgery.      Obesity (BMI 35.0-39.9 without comorbidity) ([facility])  Patient is doing physical therapy for back pain, trying to follow diet.  No substantial change in her weight.    Hypertension  Patient not following at home, she is on the prinizide, no chest pain or palpitations.     Decreased GFR  Patient is postop labs remained stable in terms of renal function.  Denies any problems with dyspnea at rest or exertion.  Mild p.m. edema at baseline.    CHF with left ventricular diastolic dysfunction, NYHA class 1 (CMS-HCC)  Denying problems with chest pain palpitations or edema.  Continues on meds as Prinzide 20/25.    Gout, chronic  Patient continues on allopurinol states she has not had any problems with gout breakthrough.    Dental caries  Still working on getting her dental work done cost is a factor.  Denies teeth pain    Dysthymia  Patient reports things are improving since she is not having as much neck discomfort.      Patient Active Problem List    Diagnosis Date Noted   • CHF with left ventricular diastolic dysfunction, NYHA class 1 (CMS-HCC) 06/14/2015     Priority: High   • Decreased GFR 09/25/2013     Priority: High   • Cervical spine disease 03/22/2017     Priority: Medium   • Obesity (BMI 35.0-39.9 without comorbidity) ([facility]) 02/25/2015     Priority: Medium   • Hypercholesterolemia 08/15/2013     Priority: Medium   • Hypertension      Priority: Medium   • Obesity (BMI 30-39.9) 02/16/2018   • Dysthymia 09/15/2017   • Right foot pain  03/22/2017   • Scalp cyst 09/15/2016   • Localized primary osteoarthritis of left lower leg 03/14/2016   • Localized primary osteoarthritis of lower leg 03/14/2016   • Dental caries 04/22/2015   • Chronic pain of left knee 08/29/2014   • Gout, chronic 07/16/2013   • Arthritis        Allergies:Amoxicillin; Percocet [apap-fd&c red #40 [place]-oxycodone]; and Tramadol    Current Outpatient Prescriptions   Medication Sig Dispense Refill   • cyclobenzaprine (FLEXERIL) 10 MG Tab Take 1 Tab by mouth 3 times a day as needed for up to 30 days. 90 Tab 0   • escitalopram (LEXAPRO) 10 MG Tab Take 1 Tab by mouth every day. 90 Tab 3   • lovastatin (MEVACOR) 10 MG tablet Take 1 Tab by mouth every day. 90 Tab 1   • allopurinol (ZYLOPRIM) 100 MG Tab Take 1 Tab by mouth 2 times a day. 180 Tab 3   • lisinopril-hydrochlorothiazide (PRINZIDE, ZESTORETIC) 20-25 MG per tablet Take 1 Tab by mouth every day. 90 Tab 3   • Cholecalciferol (CVS VITAMIN D) 2000 UNIT Cap Take 1 Cap by mouth every bedtime. For vitamin D deficiency 90 Cap 3   • Multiple Vitamins-Minerals (MULTIVITAMIN PO) Take 1 Tab by mouth every day.     • aspirin EC (ECOTRIN) 81 MG TBEC Take 81 mg by mouth every day.     • acyclovir (ZOVIRAX) 400 MG tablet Take 400 mg by mouth every day.       No current facility-administered medications for this visit.          Past Medical History:   Diagnosis Date   • Arthritis     back, knee   • Cellulitis of hand 4/22/2015   • Cold 2/17/2016    stomach flu   • Decreased GFR 9/25/2013   • Dental disorder     missing teeth   • GOUT 7/16/2013   • High cholesterol    • Hypercholesterolemia 8/15/2013   • Hypertension    • Iron deficiency anemia due to chronic blood loss 6/7/2016   • Obesity 6/7/2016   • Obesity (BMI 35.0-39.9 without comorbidity) 2/25/2015   • Pain     knees   • Psychiatric problem 09/07/2018    depression   • Scalp cyst 9/15/2016       Social History   Substance Use Topics   • Smoking status: Former Smoker     Types:  "Cigarettes     Quit date: 1990   • Smokeless tobacco: Never Used   • Alcohol use No      Comment: quit 12 yrs ago, before that drank heavily       Family Status   Relation Status   • Mo    • Fa    • Unknown (Not Specified)   • Bro Alive     Family History   Problem Relation Age of Onset   • Heart Disease Mother    • Heart Disease Father    • Diabetes Father    • Hypertension Unknown    • Heart Disease Brother    • Hypertension Brother        ROS:    Respiratory: Negative for cough, sputum production, shortness of breath or wheezing.    Cardiovascular: Negative for chest pain, palpitations, orthopnea, dyspnea with exertion or edema.   Gastrointestinal: Negative for GI upset, nausea, vomiting, abdominal pain, constipation or diarrhea.   Genitourinary: Negative for dysuria, urgency, hesitancy or frequency.       Exam:    Blood pressure 116/66, pulse 66, temperature 36.6 °C (97.8 °F), temperature source Temporal, resp. rate 18, height 1.562 m (5' 1.5\"), weight 92.5 kg (204 lb), SpO2 97 %, not currently breastfeeding.  General:  Well nourished, well developed female in NAD.  Pulmonary: Clear to ausculation and percussion.  Normal effort. No rales, rhonchi, or wheezing.  Cardiovascular: Regular rate and rhythm without murmur.   Abdomen: Normal bowel sounds soft and nontender no palpable liver spleen bladder mass.  Extremities: No LE edema noted.  Neuro: Grossly nonfocal.  Psych: Alert and oriented to person, place, and time. Appropriate mood and conversation.        This dictation was created using voice recognition software. I have made reasonable attempts to correct errors, however, errors of grammar and content may exist.          Assessment/Plan:    1. Cervical spine disease  Status post surgery gradually improving defer to neurosurgery.  Discussed PRN ibuprofen.    2. Obesity (BMI 35.0-39.9 without comorbidity) ([facility])  Minimal improvement offered referral to telemetry nutritional services she " agrees we will go ahead and refer  - Patient identified as having weight management issue.  Appropriate orders and counseling given.  - REFERRAL TO NUTRITION SERVICES    3. Essential hypertension  Blood pressure controlled continuing on meds  - COMP METABOLIC PANEL; Future  - CBC WITH DIFFERENTIAL; Future    4. Breast cancer screening  Mammogram surveillance discussed referral placed  - MA-SCREEN MAMMO W/CAD-BILAT; Future    5. Hypercholesterolemia  Ongoing lipid management on Mevacor discussed diet  - LIPID PROFILE; Future    6. Chronic gout with tophus, unspecified cause, unspecified site  Continues on allopurinol 200 mg monitor labs diet reviewed  - URIC ACID; Future    7. Needs flu shot  GFR remaining stable  - Influenza Vaccine Quad Injection >3Y ([facility])    8. Decreased GFR  GFR in the 40 range reviewed with patient.  Ongoing monitoring    9. CHF with left ventricular diastolic dysfunction, NYHA class 1 (CMS-HCC)  Clinically compensated support with weight issue    10. Dental caries  Ongoing problem she is working on financing    11. Dysthymia  Much improved with resolution of the neck discomfort.    Patient was seen for 25 minutes face to face of which more than 50% of the time was spent in counseling and coordination of care regarding the above problems.

## 2018-10-10 NOTE — ASSESSMENT & PLAN NOTE
Patient is postop labs remained stable in terms of renal function.  Denies any problems with dyspnea at rest or exertion.  Mild p.m. edema at baseline.

## 2018-10-10 NOTE — ASSESSMENT & PLAN NOTE
S/p surgical intervention, C4-C5, C5-C6 anterior cervical diskectomy and fusion with anterior plating. Aug 10, 2018 and is doing well.  She states she no longer has the shooting pains.  Some diffuse aching.  She has follow up with nuerusurgery.

## 2018-10-10 NOTE — ASSESSMENT & PLAN NOTE
Patient is doing physical therapy for back pain, trying to follow diet.  No substantial change in her weight.

## 2018-10-12 ENCOUNTER — PATIENT OUTREACH (OUTPATIENT)
Dept: HEALTH INFORMATION MANAGEMENT | Facility: OTHER | Age: 68
End: 2018-10-12

## 2018-10-12 NOTE — PROGRESS NOTES
Patient Divya Morales  was discharge on 09/11 for Cervical stenosis degenerative disc disease . [facility] Patient Advocate assisted with multiple discharge orders including 1- Primary Care Physician and 1- Neurosurgeon follow up with David Mccloud on 09/21, which patient kept both of these two appointments.  The patient has 3 future  appointment scheduled 1- Primary Care Physician , 1- Laboratory and 1- Neurosurgeon follow up with David Mccloud on 10/23.

## 2018-11-02 DIAGNOSIS — E78.00 HYPERCHOLESTEROLEMIA: ICD-10-CM

## 2018-11-07 RX ORDER — LOVASTATIN 10 MG/1
TABLET ORAL
Qty: 90 TAB | Refills: 1 | Status: SHIPPED | OUTPATIENT
Start: 2018-11-07 | End: 2019-06-04 | Stop reason: SDUPTHER

## 2019-05-31 ENCOUNTER — HOSPITAL ENCOUNTER (OUTPATIENT)
Dept: LAB | Facility: MEDICAL CENTER | Age: 69
End: 2019-05-31
Attending: INTERNAL MEDICINE
Payer: MEDICARE

## 2019-05-31 DIAGNOSIS — E78.00 HYPERCHOLESTEROLEMIA: ICD-10-CM

## 2019-05-31 DIAGNOSIS — I10 ESSENTIAL HYPERTENSION: ICD-10-CM

## 2019-05-31 DIAGNOSIS — M1A.9XX1 CHRONIC GOUT WITH TOPHUS, UNSPECIFIED CAUSE, UNSPECIFIED SITE: Chronic | ICD-10-CM

## 2019-05-31 LAB
ALBUMIN SERPL BCP-MCNC: 4 G/DL (ref 3.2–4.9)
ALBUMIN/GLOB SERPL: 1.4 G/DL
ALP SERPL-CCNC: 96 U/L (ref 30–99)
ALT SERPL-CCNC: 19 U/L (ref 2–50)
ANION GAP SERPL CALC-SCNC: 8 MMOL/L (ref 0–11.9)
AST SERPL-CCNC: 25 U/L (ref 12–45)
BASOPHILS # BLD AUTO: 0.8 % (ref 0–1.8)
BASOPHILS # BLD: 0.07 K/UL (ref 0–0.12)
BILIRUB SERPL-MCNC: 0.6 MG/DL (ref 0.1–1.5)
BUN SERPL-MCNC: 25 MG/DL (ref 8–22)
CALCIUM SERPL-MCNC: 10 MG/DL (ref 8.5–10.5)
CHLORIDE SERPL-SCNC: 103 MMOL/L (ref 96–112)
CHOLEST SERPL-MCNC: 150 MG/DL (ref 100–199)
CO2 SERPL-SCNC: 26 MMOL/L (ref 20–33)
CREAT SERPL-MCNC: 1.32 MG/DL (ref 0.5–1.4)
EOSINOPHIL # BLD AUTO: 0.45 K/UL (ref 0–0.51)
EOSINOPHIL NFR BLD: 4.9 % (ref 0–6.9)
ERYTHROCYTE [DISTWIDTH] IN BLOOD BY AUTOMATED COUNT: 46.3 FL (ref 35.9–50)
FASTING STATUS PATIENT QL REPORTED: NORMAL
GLOBULIN SER CALC-MCNC: 2.9 G/DL (ref 1.9–3.5)
GLUCOSE SERPL-MCNC: 84 MG/DL (ref 65–99)
HCT VFR BLD AUTO: 42.9 % (ref 37–47)
HDLC SERPL-MCNC: 36 MG/DL
HGB BLD-MCNC: 13.9 G/DL (ref 12–16)
IMM GRANULOCYTES # BLD AUTO: 0.04 K/UL (ref 0–0.11)
IMM GRANULOCYTES NFR BLD AUTO: 0.4 % (ref 0–0.9)
LDLC SERPL CALC-MCNC: 80 MG/DL
LYMPHOCYTES # BLD AUTO: 1.77 K/UL (ref 1–4.8)
LYMPHOCYTES NFR BLD: 19.4 % (ref 22–41)
MCH RBC QN AUTO: 29.4 PG (ref 27–33)
MCHC RBC AUTO-ENTMCNC: 32.4 G/DL (ref 33.6–35)
MCV RBC AUTO: 90.7 FL (ref 81.4–97.8)
MONOCYTES # BLD AUTO: 0.67 K/UL (ref 0–0.85)
MONOCYTES NFR BLD AUTO: 7.3 % (ref 0–13.4)
NEUTROPHILS # BLD AUTO: 6.13 K/UL (ref 2–7.15)
NEUTROPHILS NFR BLD: 67.2 % (ref 44–72)
NRBC # BLD AUTO: 0 K/UL
NRBC BLD-RTO: 0 /100 WBC
PLATELET # BLD AUTO: 253 K/UL (ref 164–446)
PMV BLD AUTO: 11.8 FL (ref 9–12.9)
POTASSIUM SERPL-SCNC: 3.9 MMOL/L (ref 3.6–5.5)
PROT SERPL-MCNC: 6.9 G/DL (ref 6–8.2)
RBC # BLD AUTO: 4.73 M/UL (ref 4.2–5.4)
SODIUM SERPL-SCNC: 137 MMOL/L (ref 135–145)
TRIGL SERPL-MCNC: 171 MG/DL (ref 0–149)
URATE SERPL-MCNC: 7.2 MG/DL (ref 1.9–8.2)
WBC # BLD AUTO: 9.1 K/UL (ref 4.8–10.8)

## 2019-05-31 PROCEDURE — 84550 ASSAY OF BLOOD/URIC ACID: CPT

## 2019-05-31 PROCEDURE — 36415 COLL VENOUS BLD VENIPUNCTURE: CPT

## 2019-05-31 PROCEDURE — 85025 COMPLETE CBC W/AUTO DIFF WBC: CPT

## 2019-05-31 PROCEDURE — 80061 LIPID PANEL: CPT

## 2019-05-31 PROCEDURE — 80053 COMPREHEN METABOLIC PANEL: CPT

## 2019-06-04 ENCOUNTER — OFFICE VISIT (OUTPATIENT)
Dept: MEDICAL GROUP | Facility: PHYSICIAN GROUP | Age: 69
End: 2019-06-04
Payer: MEDICARE

## 2019-06-04 VITALS
OXYGEN SATURATION: 96 % | HEART RATE: 70 BPM | WEIGHT: 204.9 LBS | HEIGHT: 62 IN | BODY MASS INDEX: 37.71 KG/M2 | SYSTOLIC BLOOD PRESSURE: 120 MMHG | DIASTOLIC BLOOD PRESSURE: 72 MMHG | TEMPERATURE: 97.1 F | RESPIRATION RATE: 16 BRPM

## 2019-06-04 DIAGNOSIS — R94.4 DECREASED GFR: ICD-10-CM

## 2019-06-04 DIAGNOSIS — E66.9 OBESITY (BMI 30-39.9): ICD-10-CM

## 2019-06-04 DIAGNOSIS — I50.30 CHF WITH LEFT VENTRICULAR DIASTOLIC DYSFUNCTION, NYHA CLASS 1 (HCC): ICD-10-CM

## 2019-06-04 DIAGNOSIS — F34.1 DYSTHYMIA: ICD-10-CM

## 2019-06-04 DIAGNOSIS — F43.9 STRESS AT HOME: ICD-10-CM

## 2019-06-04 DIAGNOSIS — M48.9 CERVICAL SPINE DISEASE: ICD-10-CM

## 2019-06-04 DIAGNOSIS — E78.00 HYPERCHOLESTEROLEMIA: ICD-10-CM

## 2019-06-04 DIAGNOSIS — M1A.9XX1 CHRONIC GOUT WITH TOPHUS, UNSPECIFIED CAUSE, UNSPECIFIED SITE: Chronic | ICD-10-CM

## 2019-06-04 DIAGNOSIS — G89.29 CHRONIC PAIN OF LEFT KNEE: ICD-10-CM

## 2019-06-04 DIAGNOSIS — E55.9 VITAMIN D DEFICIENCY: ICD-10-CM

## 2019-06-04 DIAGNOSIS — I10 ESSENTIAL HYPERTENSION: ICD-10-CM

## 2019-06-04 DIAGNOSIS — M10.9 GOUT, UNSPECIFIED CAUSE, UNSPECIFIED CHRONICITY, UNSPECIFIED SITE: ICD-10-CM

## 2019-06-04 DIAGNOSIS — M25.562 CHRONIC PAIN OF LEFT KNEE: ICD-10-CM

## 2019-06-04 PROCEDURE — 99214 OFFICE O/P EST MOD 30 MIN: CPT | Performed by: INTERNAL MEDICINE

## 2019-06-04 RX ORDER — LISINOPRIL AND HYDROCHLOROTHIAZIDE 25; 20 MG/1; MG/1
1 TABLET ORAL DAILY
Qty: 90 TAB | Refills: 3 | Status: SHIPPED | OUTPATIENT
Start: 2019-06-04 | End: 2019-12-09

## 2019-06-04 RX ORDER — LOVASTATIN 10 MG/1
10 TABLET ORAL DAILY
Qty: 90 TAB | Refills: 3 | Status: SHIPPED | OUTPATIENT
Start: 2019-06-04 | End: 2020-07-07

## 2019-06-04 RX ORDER — ESCITALOPRAM OXALATE 20 MG/1
20 TABLET ORAL DAILY
Qty: 90 TAB | Refills: 1 | Status: SHIPPED | OUTPATIENT
Start: 2019-06-04 | End: 2020-02-03

## 2019-06-04 RX ORDER — ALLOPURINOL 100 MG/1
200 TABLET ORAL DAILY
Qty: 180 TAB | Refills: 3 | Status: SHIPPED | OUTPATIENT
Start: 2019-06-04 | End: 2020-07-07

## 2019-06-04 RX ORDER — ESCITALOPRAM OXALATE 20 MG/1
20 TABLET ORAL DAILY
Qty: 30 TAB | Refills: 5 | Status: SHIPPED | OUTPATIENT
Start: 2019-06-04 | End: 2019-06-04 | Stop reason: SDUPTHER

## 2019-06-04 NOTE — ASSESSMENT & PLAN NOTE
States neck is much better since surgery, still feels stiff and achy, she states unable to afford injections, rare ibuprofen, tylenol doesn't help at all.

## 2019-12-05 ENCOUNTER — HOSPITAL ENCOUNTER (OUTPATIENT)
Dept: LAB | Facility: MEDICAL CENTER | Age: 69
End: 2019-12-05
Attending: INTERNAL MEDICINE
Payer: MEDICARE

## 2019-12-05 DIAGNOSIS — E78.00 HYPERCHOLESTEROLEMIA: ICD-10-CM

## 2019-12-05 DIAGNOSIS — E55.9 VITAMIN D DEFICIENCY: ICD-10-CM

## 2019-12-05 DIAGNOSIS — I10 ESSENTIAL HYPERTENSION: ICD-10-CM

## 2019-12-05 LAB
25(OH)D3 SERPL-MCNC: 48 NG/ML (ref 30–100)
ALBUMIN SERPL BCP-MCNC: 3.9 G/DL (ref 3.2–4.9)
ALBUMIN/GLOB SERPL: 1.2 G/DL
ALP SERPL-CCNC: 102 U/L (ref 30–99)
ALT SERPL-CCNC: 17 U/L (ref 2–50)
ANION GAP SERPL CALC-SCNC: 9 MMOL/L (ref 0–11.9)
AST SERPL-CCNC: 23 U/L (ref 12–45)
BASOPHILS # BLD AUTO: 0.9 % (ref 0–1.8)
BASOPHILS # BLD: 0.07 K/UL (ref 0–0.12)
BILIRUB SERPL-MCNC: 0.5 MG/DL (ref 0.1–1.5)
BUN SERPL-MCNC: 31 MG/DL (ref 8–22)
CALCIUM SERPL-MCNC: 9.4 MG/DL (ref 8.5–10.5)
CHLORIDE SERPL-SCNC: 105 MMOL/L (ref 96–112)
CHOLEST SERPL-MCNC: 131 MG/DL (ref 100–199)
CO2 SERPL-SCNC: 27 MMOL/L (ref 20–33)
CREAT SERPL-MCNC: 1.55 MG/DL (ref 0.5–1.4)
EOSINOPHIL # BLD AUTO: 0.53 K/UL (ref 0–0.51)
EOSINOPHIL NFR BLD: 6.7 % (ref 0–6.9)
ERYTHROCYTE [DISTWIDTH] IN BLOOD BY AUTOMATED COUNT: 48 FL (ref 35.9–50)
FASTING STATUS PATIENT QL REPORTED: NORMAL
GLOBULIN SER CALC-MCNC: 3.2 G/DL (ref 1.9–3.5)
GLUCOSE SERPL-MCNC: 89 MG/DL (ref 65–99)
HCT VFR BLD AUTO: 41.4 % (ref 37–47)
HDLC SERPL-MCNC: 41 MG/DL
HGB BLD-MCNC: 13.2 G/DL (ref 12–16)
IMM GRANULOCYTES # BLD AUTO: 0.02 K/UL (ref 0–0.11)
IMM GRANULOCYTES NFR BLD AUTO: 0.3 % (ref 0–0.9)
LDLC SERPL CALC-MCNC: 68 MG/DL
LYMPHOCYTES # BLD AUTO: 1.55 K/UL (ref 1–4.8)
LYMPHOCYTES NFR BLD: 19.5 % (ref 22–41)
MCH RBC QN AUTO: 29.3 PG (ref 27–33)
MCHC RBC AUTO-ENTMCNC: 31.9 G/DL (ref 33.6–35)
MCV RBC AUTO: 91.8 FL (ref 81.4–97.8)
MONOCYTES # BLD AUTO: 0.52 K/UL (ref 0–0.85)
MONOCYTES NFR BLD AUTO: 6.5 % (ref 0–13.4)
NEUTROPHILS # BLD AUTO: 5.25 K/UL (ref 2–7.15)
NEUTROPHILS NFR BLD: 66.1 % (ref 44–72)
NRBC # BLD AUTO: 0 K/UL
NRBC BLD-RTO: 0 /100 WBC
PLATELET # BLD AUTO: 243 K/UL (ref 164–446)
PMV BLD AUTO: 11.3 FL (ref 9–12.9)
POTASSIUM SERPL-SCNC: 3.8 MMOL/L (ref 3.6–5.5)
PROT SERPL-MCNC: 7.1 G/DL (ref 6–8.2)
RBC # BLD AUTO: 4.51 M/UL (ref 4.2–5.4)
SODIUM SERPL-SCNC: 141 MMOL/L (ref 135–145)
TRIGL SERPL-MCNC: 108 MG/DL (ref 0–149)
WBC # BLD AUTO: 7.9 K/UL (ref 4.8–10.8)

## 2019-12-05 PROCEDURE — 80061 LIPID PANEL: CPT

## 2019-12-05 PROCEDURE — 80053 COMPREHEN METABOLIC PANEL: CPT

## 2019-12-05 PROCEDURE — 85025 COMPLETE CBC W/AUTO DIFF WBC: CPT

## 2019-12-05 PROCEDURE — 82306 VITAMIN D 25 HYDROXY: CPT

## 2019-12-05 PROCEDURE — 36415 COLL VENOUS BLD VENIPUNCTURE: CPT

## 2019-12-09 ENCOUNTER — OFFICE VISIT (OUTPATIENT)
Dept: MEDICAL GROUP | Facility: PHYSICIAN GROUP | Age: 69
End: 2019-12-09
Payer: MEDICARE

## 2019-12-09 VITALS
HEART RATE: 56 BPM | WEIGHT: 200 LBS | HEIGHT: 62 IN | OXYGEN SATURATION: 95 % | BODY MASS INDEX: 36.8 KG/M2 | SYSTOLIC BLOOD PRESSURE: 120 MMHG | RESPIRATION RATE: 12 BRPM | DIASTOLIC BLOOD PRESSURE: 66 MMHG | TEMPERATURE: 97.7 F

## 2019-12-09 DIAGNOSIS — E66.9 OBESITY (BMI 30-39.9): ICD-10-CM

## 2019-12-09 DIAGNOSIS — Z23 NEEDS FLU SHOT: ICD-10-CM

## 2019-12-09 DIAGNOSIS — I50.30 CHF WITH LEFT VENTRICULAR DIASTOLIC DYSFUNCTION, NYHA CLASS 1 (HCC): ICD-10-CM

## 2019-12-09 DIAGNOSIS — G89.29 CHRONIC PAIN OF LEFT KNEE: ICD-10-CM

## 2019-12-09 DIAGNOSIS — M19.90 ARTHRITIS: ICD-10-CM

## 2019-12-09 DIAGNOSIS — M17.12 LOCALIZED PRIMARY OSTEOARTHRITIS OF LEFT LOWER LEG: ICD-10-CM

## 2019-12-09 DIAGNOSIS — F34.1 DYSTHYMIA: ICD-10-CM

## 2019-12-09 DIAGNOSIS — M25.562 CHRONIC PAIN OF LEFT KNEE: ICD-10-CM

## 2019-12-09 DIAGNOSIS — R94.4 DECREASED GFR: ICD-10-CM

## 2019-12-09 PROCEDURE — 99214 OFFICE O/P EST MOD 30 MIN: CPT | Mod: 25 | Performed by: INTERNAL MEDICINE

## 2019-12-09 RX ORDER — LISINOPRIL 20 MG/1
20 TABLET ORAL DAILY
Qty: 30 TAB | Refills: 3 | Status: SHIPPED | OUTPATIENT
Start: 2019-12-09 | End: 2020-05-04

## 2019-12-09 RX ORDER — CELECOXIB 100 MG/1
100 CAPSULE ORAL DAILY
Qty: 60 CAP | Refills: 3 | Status: SHIPPED
Start: 2019-12-09 | End: 2020-02-11

## 2019-12-09 NOTE — ASSESSMENT & PLAN NOTE
States she hurts all over, neck finger toes, left shoulder.  She has been taking 800 mg ibuprofen but tries to hold this to 1 x a nite.

## 2019-12-09 NOTE — ASSESSMENT & PLAN NOTE
Patient is reporting some episodes of feeling dizzy and lite headed x 2 weeks, not for the last 2 days though.  No ankle swelling.

## 2019-12-09 NOTE — PROGRESS NOTES
Chief Complaint   Patient presents with   • Lab Results   • Medication Refill       HISTORY OF PRESENT ILLNESS: Patient is a 69 y.o. female established patient who presents today to discuss the medical issues below.    CHF with left ventricular diastolic dysfunction, NYHA class 1 (CMS-HCC)  Patient is reporting some episodes of feeling dizzy and lite headed x 2 weeks, not for the last 2 days though.  No ankle swelling.      Arthritis  States she hurts all over, neck finger toes, left shoulder.  She has been taking 800 mg ibuprofen but tries to hold this to 1 x a nite.      Dysthymia  Admits to lots of stress at home, frontal.  States recently worse with the extra stress.      Chronic pain of left knee  History of left knee aching, following with ortho.      Localized primary osteoarthritis of left lower leg  Has seen ortho in the past but not in several years.  Complains of pain.        Patient Active Problem List    Diagnosis Date Noted   • CHF with left ventricular diastolic dysfunction, NYHA class 1 (CMS-HCC) 06/14/2015     Priority: High   • Decreased GFR 09/25/2013     Priority: High   • Cervical spine disease 03/22/2017     Priority: Medium   • Obesity (BMI 35.0-39.9 without comorbidity) ([facility]) 02/25/2015     Priority: Medium   • Hypercholesterolemia 08/15/2013     Priority: Medium   • Hypertension      Priority: Medium   • Stress at home 06/04/2019   • Obesity (BMI 30-39.9) 02/16/2018   • Dysthymia 09/15/2017   • Right foot pain 03/22/2017   • Scalp cyst 09/15/2016   • Localized primary osteoarthritis of left lower leg 03/14/2016   • Localized primary osteoarthritis of lower leg 03/14/2016   • Dental caries 04/22/2015   • Chronic pain of left knee 08/29/2014   • Gout, chronic 07/16/2013   • Arthritis        Allergies:Amoxicillin; Percocet [apap-fd&c red #40 [place]-oxycodone]; and Tramadol    Current Outpatient Medications   Medication Sig Dispense Refill   • celecoxib (CELEBREX) 100 MG Cap Take 1 Cap by  mouth every day. 60 Cap 3   • lisinopril (PRINIVIL) 20 MG Tab Take 1 Tab by mouth every day. 30 Tab 3   • escitalopram (LEXAPRO) 20 MG tablet Take 1 Tab by mouth every day. 90 Tab 1   • lovastatin (MEVACOR) 10 MG tablet Take 1 Tab by mouth every day. 90 Tab 3   • allopurinol (ZYLOPRIM) 100 MG Tab Take 2 Tabs by mouth every day. 180 Tab 3   • Cholecalciferol (CVS VITAMIN D) 2000 UNIT Cap Take 1 Cap by mouth every bedtime. For vitamin D deficiency 90 Cap 3   • aspirin EC (ECOTRIN) 81 MG Tablet Delayed Response Take 1 Tab by mouth every day. 90 Tab 3   • acyclovir (ZOVIRAX) 400 MG tablet Take 400 mg by mouth every day.     • Multiple Vitamins-Minerals (MULTIVITAMIN PO) Take 1 Tab by mouth every day.       No current facility-administered medications for this visit.          Past Medical History:   Diagnosis Date   • Arthritis     back, knee   • Cellulitis of hand 2015   • Cold 2016    stomach flu   • Decreased GFR 2013   • Dental disorder     missing teeth   • GOUT 2013   • High cholesterol    • Hypercholesterolemia 8/15/2013   • Hypertension    • Iron deficiency anemia due to chronic blood loss 2016   • Obesity 2016   • Obesity (BMI 35.0-39.9 without comorbidity) 2015   • Pain     knees   • Psychiatric problem 2018    depression   • Scalp cyst 9/15/2016       Social History     Tobacco Use   • Smoking status: Former Smoker     Types: Cigarettes     Last attempt to quit: 1990     Years since quittin.9   • Smokeless tobacco: Never Used   Substance Use Topics   • Alcohol use: No     Alcohol/week: 0.0 oz     Comment: quit 12 yrs ago, before that drank heavily   • Drug use: No       Family Status   Relation Name Status   • Mo     • Fa     • Unknown  (Not Specified)   • Bro  Alive     Family History   Problem Relation Age of Onset   • Heart Disease Mother    • Heart Disease Father    • Diabetes Father    • Hypertension Unknown    • Heart Disease Brother    •  "Hypertension Brother        ROS:    Respiratory: Negative for cough, sputum production, shortness of breath or wheezing.    Cardiovascular: Negative for chest pain, palpitations, orthopnea, dyspnea with exertion or edema.   Gastrointestinal: Negative for GI upset, nausea, vomiting, abdominal pain, constipation or diarrhea.   Genitourinary: Negative for dysuria, urgency, hesitancy or frequency.       Exam:    /66 (BP Location: Left arm, Patient Position: Sitting, BP Cuff Size: Adult)   Pulse (!) 56   Temp 36.5 °C (97.7 °F)   Resp 12   Ht 1.562 m (5' 1.5\")   Wt 90.7 kg (200 lb)   SpO2 95%   General:  Well nourished, well developed female in NAD.  Spine: Diffuse lumbar tenderness no localization to a vertebral body negative straight leg testing.  Pulmonary: Clear to ausculation and percussion.  Normal effort. No rales, rhonchi, or wheezing.  Cardiovascular: Regular rate and rhythm without murmur.   Abdomen: Normal bowel sounds soft and nontender no palpable liver spleen bladder mass.  Extremities: No LE edema noted.  Neuro: Grossly nonfocal.  Psych: Alert and oriented to person, place, and time. Appropriate mood and conversation.    LABS: Results reviewed and discussed with the patient, questions answered.      This dictation was created using voice recognition software. I have made reasonable attempts to correct errors, however, errors of grammar and content may exist.          Assessment/Plan:    1. CHF with left ventricular diastolic dysfunction, NYHA class 1 (CMS-HCC)  Clinically compensated trial discontinuation of diuretic for renal protection continuing on ACE inhibitor discussed weight loss  - Comp Metabolic Panel; Future    2. Arthritis  Multiple joints.  Monitor with Celebrex discussed potential risk for renal function.  Referral to Ortho for consideration of intervention of the left knee.  Regular physical exercise stretching and strengthening as discussed regarding her other joints.    3. " Dysthymia  Patient focusing on family stress issues.  Support given.  Referral to behavioral health.  Continuing on his Catalpa M 20 mg a day.  - REFERRAL TO BEHAVIORAL HEALTH    4. Chronic pain of left knee  Ortho as above monitor with Celebrex monitor renal function  - REFERRAL TO ORTHOPEDICS    5. Localized primary osteoarthritis of left lower leg  As above    6. Needs flu shot    - INFLUENZA VACCINE, HIGH DOSE (65+ ONLY)    7. Decreased GFR  Implications of the GFR of 33 are discussed.  Her baseline is generally in the 40s.  Monitor with decrease diuretic, however, increased risk with the nonsteroidals.  She states understanding.  Early follow-up.  - Comp Metabolic Panel; Future    8.  Obesity  Implications of weight discussed.  Continue to encourage weight loss.    Patient was seen for 25 minutes face to face of which more than 50% of the time was spent in counseling and coordination of care regarding the above problems.

## 2019-12-10 PROCEDURE — 90662 IIV NO PRSV INCREASED AG IM: CPT | Performed by: INTERNAL MEDICINE

## 2019-12-10 PROCEDURE — G0008 ADMIN INFLUENZA VIRUS VAC: HCPCS | Performed by: INTERNAL MEDICINE

## 2020-01-28 ENCOUNTER — TELEPHONE (OUTPATIENT)
Dept: MEDICAL GROUP | Facility: PHYSICIAN GROUP | Age: 70
End: 2020-01-28

## 2020-01-28 NOTE — TELEPHONE ENCOUNTER
1. Caller Name: Virginia Nini Harrison                                         Call Back Number: [phone] (home)         Patient approves a detailed voicemail message: no    Pt called in to report that the celebrex seems to cause her to have headaches and dizziness. She would like to know if she should stop taking the medication.   Thank you.

## 2020-01-28 NOTE — TELEPHONE ENCOUNTER
Please notify patient: Good idea to stop Celebrex.  She has been referred to Ortho to consider options for treating the knee.

## 2020-02-11 ENCOUNTER — OFFICE VISIT (OUTPATIENT)
Dept: MEDICAL GROUP | Facility: PHYSICIAN GROUP | Age: 70
End: 2020-02-11
Payer: MEDICARE

## 2020-02-11 VITALS
HEIGHT: 62 IN | RESPIRATION RATE: 16 BRPM | SYSTOLIC BLOOD PRESSURE: 120 MMHG | DIASTOLIC BLOOD PRESSURE: 74 MMHG | TEMPERATURE: 97.9 F | HEART RATE: 61 BPM | BODY MASS INDEX: 36.7 KG/M2 | OXYGEN SATURATION: 97 % | WEIGHT: 199.4 LBS

## 2020-02-11 DIAGNOSIS — G44.89 OTHER HEADACHE SYNDROME: ICD-10-CM

## 2020-02-11 DIAGNOSIS — M48.9 CERVICAL SPINE DISEASE: ICD-10-CM

## 2020-02-11 DIAGNOSIS — F43.9 STRESS AT HOME: ICD-10-CM

## 2020-02-11 PROCEDURE — 99214 OFFICE O/P EST MOD 30 MIN: CPT | Performed by: INTERNAL MEDICINE

## 2020-02-11 RX ORDER — CYCLOBENZAPRINE HCL 10 MG
10 TABLET ORAL NIGHTLY PRN
Qty: 30 TAB | Refills: 0 | Status: SHIPPED | OUTPATIENT
Start: 2020-02-11 | End: 2020-09-30

## 2020-02-11 RX ORDER — CLINDAMYCIN HYDROCHLORIDE 300 MG/1
300 CAPSULE ORAL 3 TIMES DAILY
Qty: 30 CAP | Refills: 0 | Status: SHIPPED | OUTPATIENT
Start: 2020-02-11 | End: 2020-06-15

## 2020-02-11 ASSESSMENT — PATIENT HEALTH QUESTIONNAIRE - PHQ9
SUM OF ALL RESPONSES TO PHQ QUESTIONS 1-9: 0
1. LITTLE INTEREST OR PLEASURE IN DOING THINGS: NOT AT ALL
8. MOVING OR SPEAKING SO SLOWLY THAT OTHER PEOPLE COULD HAVE NOTICED. OR THE OPPOSITE, BEING SO FIGETY OR RESTLESS THAT YOU HAVE BEEN MOVING AROUND A LOT MORE THAN USUAL: NOT AT ALL
2. FEELING DOWN, DEPRESSED, IRRITABLE, OR HOPELESS: NOT AT ALL
9. THOUGHTS THAT YOU WOULD BE BETTER OFF DEAD, OR OF HURTING YOURSELF: NOT AT ALL
3. TROUBLE FALLING OR STAYING ASLEEP OR SLEEPING TOO MUCH: NOT AT ALL
6. FEELING BAD ABOUT YOURSELF - OR THAT YOU ARE A FAILURE OR HAVE LET YOURSELF OR YOUR FAMILY DOWN: NOT AL ALL
5. POOR APPETITE OR OVEREATING: NOT AT ALL
7. TROUBLE CONCENTRATING ON THINGS, SUCH AS READING THE NEWSPAPER OR WATCHING TELEVISION: NOT AT ALL
4. FEELING TIRED OR HAVING LITTLE ENERGY: NOT AT ALL
SUM OF ALL RESPONSES TO PHQ9 QUESTIONS 1 AND 2: 0

## 2020-02-11 NOTE — ASSESSMENT & PLAN NOTE
Patient reports improved stress at home with bipolar daughter moved out about 1 week ago.  Patient reports this occurred with fights.

## 2020-02-11 NOTE — PROGRESS NOTES
Chief Complaint   Patient presents with   • Headache       HISTORY OF PRESENT ILLNESS: Patient is a 69 y.o. female established patient who presents today to discuss the medical issues below.    Cervical spine disease  S/p surgical intervention [facility] , C4-C5, C5-C6 anterior cervical diskectomy annd fusion with anterior plating. Aug 10, 2018, has not been back to follow up. States still with quite a bit of aching in the neck, improves with ibuprofen, celebrex not too helpful.      Other headache syndrome  Patient is complaining of headaches.  She reports this began around Jan.  She felt it might be related to the celebrex so she stopped that, the headaches did not change.  She describes pain at the middle of her forehead, radiates bilaterally then sents of neck pain that radiates upward into her skull.  Headaches come and go, she can feel gradually worsens.  She does feel ibuprofen helps but takes a while.  Rest helps.  She reports she has been awakened from sleep.  Lasts hours, reports occurring daily.  No visual changes, light bothers her a little bit.  She has some sinus congestion, no change from baseline for her on this.  She reports history of migraine headaches however, reports it has been awhile and she has never taken migraine specific medications.  No numbness tingling or weakness.      Stress at home  Patient reports improved stress at home with bipolar daughter moved out about 1 week ago.  Patient reports this occurred with fights.       Patient Active Problem List    Diagnosis Date Noted   • CHF with left ventricular diastolic dysfunction, NYHA class 1 (CMS-[facility]) 06/14/2015     Priority: High   • Decreased GFR 09/25/2013     Priority: High   • Cervical spine disease 03/22/2017     Priority: Medium   • Obesity (BMI 35.0-39.9 without comorbidity) ([facility]) 02/25/2015     Priority: Medium   • Hypercholesterolemia 08/15/2013     Priority: Medium   • Hypertension      Priority: Medium   • Other headache  syndrome 02/11/2020   • Stress at home 06/04/2019   • Obesity (BMI 30-39.9) 02/16/2018   • Dysthymia 09/15/2017   • Right foot pain 03/22/2017   • Scalp cyst 09/15/2016   • Localized primary osteoarthritis of left lower leg 03/14/2016   • Localized primary osteoarthritis of lower leg 03/14/2016   • Dental caries 04/22/2015   • Chronic pain of left knee 08/29/2014   • Gout, chronic 07/16/2013   • Arthritis        Allergies:Amoxicillin; Percocet [apap-fd&c red #40 [place]-oxycodone]; and Tramadol    Current Outpatient Medications   Medication Sig Dispense Refill   • escitalopram (LEXAPRO) 20 MG tablet TAKE 1 TABLET BY MOUTH ONCE DAILY 90 Tab 3   • lisinopril (PRINIVIL) 20 MG Tab Take 1 Tab by mouth every day. 30 Tab 3   • lovastatin (MEVACOR) 10 MG tablet Take 1 Tab by mouth every day. 90 Tab 3   • allopurinol (ZYLOPRIM) 100 MG Tab Take 2 Tabs by mouth every day. 180 Tab 3   • Cholecalciferol (CVS VITAMIN D) 2000 UNIT Cap Take 1 Cap by mouth every bedtime. For vitamin D deficiency 90 Cap 3   • aspirin EC (ECOTRIN) 81 MG Tablet Delayed Response Take 1 Tab by mouth every day. 90 Tab 3   • acyclovir (ZOVIRAX) 400 MG tablet Take 400 mg by mouth every day.     • Multiple Vitamins-Minerals (MULTIVITAMIN PO) Take 1 Tab by mouth every day.       No current facility-administered medications for this visit.          Past Medical History:   Diagnosis Date   • Arthritis     back, knee   • Cellulitis of hand 4/22/2015   • Cold 2/17/2016    stomach flu   • Decreased GFR 9/25/2013   • Dental disorder     missing teeth   • GOUT 7/16/2013   • High cholesterol    • Hypercholesterolemia 8/15/2013   • Hypertension    • Iron deficiency anemia due to chronic blood loss 6/7/2016   • Obesity 6/7/2016   • Obesity (BMI 35.0-39.9 without comorbidity) 2/25/2015   • Pain     knees   • Psychiatric problem 09/07/2018    depression   • Scalp cyst 9/15/2016       Social History     Tobacco Use   • Smoking status: Former Smoker     Packs/day: 0.00      "Types: Cigarettes     Last attempt to quit: 1990     Years since quittin.1   • Smokeless tobacco: Never Used   Substance Use Topics   • Alcohol use: No     Alcohol/week: 0.0 oz     Comment: quit 12 yrs ago, before that drank heavily   • Drug use: No       Family Status   Relation Name Status   • Mo     • Fa     • OTHER  (Not Specified)   • Bro  Alive     Family History   Problem Relation Age of Onset   • Heart Disease Mother    • Heart Disease Father    • Diabetes Father    • Hypertension Other    • Heart Disease Brother    • Hypertension Brother        ROS:    Respiratory: Negative for cough, sputum production, shortness of breath or wheezing.    Cardiovascular: Negative for chest pain, palpitations, orthopnea, dyspnea with exertion or edema.   Gastrointestinal: Negative for GI upset, nausea, vomiting, abdominal pain, constipation or diarrhea.   Genitourinary: Negative for dysuria, urgency, hesitancy or frequency.       Exam:    /74   Pulse 61   Temp 36.6 °C (97.9 °F) (Temporal)   Resp 16   Ht 1.562 m (5' 1.5\")   Wt 90.4 kg (199 lb 6.4 oz)   SpO2 97%  Body mass index is 37.07 kg/m².  General:  Well nourished, well developed female in NAD.  HENT: Normocephalic, bilateral TMs are intact, nasal and oral mucosa with no lesions,   Neck: diffuse tenderness at the cervical spine, some point tenderness at C3 area,  Pulmonary: Clear to ausculation and percussion.  Normal effort. No rales, rhonchi, or wheezing.  Cardiovascular: Regular rate and rhythm without murmur.   Abdomen: Normal bowel sounds soft and nontender no palpable liver spleen bladder mass.  Extremities: No LE edema noted.  Neuro: Grossly nonfocal.  Psych: Alert and oriented to person, place, and time. Appropriate mood and conversation.        This dictation was created using voice recognition software. I have made reasonable attempts to correct errors, however, errors of grammar and content may " exist.          Assessment/Plan:    1. Cervical spine disease  Patient with known cervical spine disease, not clear if this is contributing to headache.      2. Other headache syndrome  Not classical, differential includes sinusitis, tension and since neck recruits, chronic pain from the neck is possible.  Referral back to spine as she feels her neck is getting worse, add muscle relaxant at bedtime.  Encouraged ongoing ibuprofen.  C/e sinusitis, monitor with course of antibiotics.  Referral to ENT discussed.  Teeth in poor repair.     3. Stress at home  Stress may contribute to headache, support given.         Patient was seen for 25 minutes face to face of which more than 50% of the time was spent in counseling and coordination of care regarding the above problems.

## 2020-02-11 NOTE — ASSESSMENT & PLAN NOTE
Patient is complaining of headaches.  She reports this began around Jan.  She felt it might be related to the celebrex so she stopped that, the headaches did not change.  She describes pain at the middle of her forehead, radiates bilaterally then sents of neck pain that radiates upward into her skull.  Headaches come and go, she can feel gradually worsens.  She does feel ibuprofen helps but takes a while.  Rest helps.  She reports she has been awakened from sleep.  Lasts hours, reports occurring daily.  No visual changes, light bothers her a little bit.  She has some sinus congestion, no change from baseline for her on this.  She reports history of migraine headaches however, reports it has been awhile and she has never taken migraine specific medications.  No numbness tingling or weakness.

## 2020-02-11 NOTE — ASSESSMENT & PLAN NOTE
S/p surgical intervention Dr See , C4-C5, C5-C6 anterior cervical diskectomy annd fusion with anterior plating. Aug 10, 2018, has not been back to follow up. States still with quite a bit of aching in the neck, improves with ibuprofen, celebrex not too helpful.

## 2020-02-11 NOTE — PATIENT INSTRUCTIONS
Neurosurgery Dr Cleveland  [facility]  [address]  [address]  [place]  [address]  Phone: [phone]  Fax: [phone]     Clindamycin 300 mg 1 3 x a day with food  If headache persists   the rx for the muscle relaxant and take at bedtime

## 2020-05-29 ENCOUNTER — OFFICE VISIT (OUTPATIENT)
Dept: URGENT CARE | Facility: PHYSICIAN GROUP | Age: 70
End: 2020-05-29
Payer: MEDICARE

## 2020-05-29 VITALS
DIASTOLIC BLOOD PRESSURE: 62 MMHG | WEIGHT: 198 LBS | TEMPERATURE: 98.7 F | OXYGEN SATURATION: 98 % | BODY MASS INDEX: 36.44 KG/M2 | HEART RATE: 60 BPM | SYSTOLIC BLOOD PRESSURE: 122 MMHG | HEIGHT: 62 IN

## 2020-05-29 DIAGNOSIS — H10.13 ALLERGIC CONJUNCTIVITIS OF BOTH EYES: ICD-10-CM

## 2020-05-29 PROCEDURE — 99214 OFFICE O/P EST MOD 30 MIN: CPT | Performed by: PHYSICIAN ASSISTANT

## 2020-05-29 RX ORDER — CROMOLYN SODIUM 40 MG/ML
2 SOLUTION/ DROPS OPHTHALMIC
Qty: 10 ML | Refills: 0 | Status: SHIPPED | OUTPATIENT
Start: 2020-05-29 | End: 2021-04-05

## 2020-05-29 RX ORDER — OLOPATADINE HYDROCHLORIDE 1 MG/ML
1 SOLUTION/ DROPS OPHTHALMIC 2 TIMES DAILY
Qty: 5 ML | Refills: 0 | Status: SHIPPED | OUTPATIENT
Start: 2020-05-29 | End: 2021-04-05

## 2020-05-29 RX ORDER — CELECOXIB 100 MG/1
100 CAPSULE ORAL
COMMUNITY
Start: 2020-03-24 | End: 2020-09-30

## 2020-05-29 ASSESSMENT — FIBROSIS 4 INDEX: FIB4 SCORE: 1.63

## 2020-05-29 NOTE — PROGRESS NOTES
Chief Complaint   Patient presents with   • Conjunctivitis     x 1 day       HISTORY OF PRESENT ILLNESS: Patient is a 69 y.o. female who presents today because She has a 1 day history of left eye redness and itching and irritation, only watery blurred vision.  She tried some over-the-counter drops and it did not help.  No drainage.    Patient Active Problem List    Diagnosis Date Noted   • CHF with left ventricular diastolic dysfunction, NYHA class 1 (CMS-[facility]) 06/14/2015     Priority: High   • Decreased GFR 09/25/2013     Priority: High   • Cervical spine disease 03/22/2017     Priority: Medium   • Obesity (BMI 35.0-39.9 without comorbidity) ([facility]) 02/25/2015     Priority: Medium   • Hypercholesterolemia 08/15/2013     Priority: Medium   • Hypertension      Priority: Medium   • Other headache syndrome 02/11/2020   • Stress at home 06/04/2019   • Obesity (BMI 30-39.9) 02/16/2018   • Dysthymia 09/15/2017   • Right foot pain 03/22/2017   • Scalp cyst 09/15/2016   • Localized primary osteoarthritis of left lower leg 03/14/2016   • Localized primary osteoarthritis of lower leg 03/14/2016   • Dental caries 04/22/2015   • Chronic pain of left knee 08/29/2014   • Gout, chronic 07/16/2013   • Arthritis        Allergies:Amoxicillin; Percocet [apap-fd&c red #40 [place]-oxycodone]; and Tramadol    Current Outpatient Medications Ordered in Epic   Medication Sig Dispense Refill   • celecoxib (CELEBREX) 100 MG Cap Take 100 mg by mouth.     • olopatadine (PATANOL) 0.1 % ophthalmic solution Place 1 Drop in both eyes 2 times a day. 5 mL 0   • lisinopril (PRINIVIL) 20 MG Tab Take 1 tablet by mouth once daily 90 Tab 3   • clindamycin (CLEOCIN) 300 MG Cap Take 1 Cap by mouth 3 times a day. 30 Cap 0   • cyclobenzaprine (FLEXERIL) 10 MG Tab Take 1 Tab by mouth at bedtime as needed. 30 Tab 0   • escitalopram (LEXAPRO) 20 MG tablet TAKE 1 TABLET BY MOUTH ONCE DAILY 90 Tab 3   • lovastatin (MEVACOR) 10 MG tablet Take 1 Tab by mouth every day.  90 Tab 3   • allopurinol (ZYLOPRIM) 100 MG Tab Take 2 Tabs by mouth every day. 180 Tab 3   • Cholecalciferol ([employer]S VITAMIN D) 2000 UNIT Cap Take 1 Cap by mouth every bedtime. For vitamin D deficiency 90 Cap 3   • aspirin EC (ECOTRIN) 81 MG Tablet Delayed Response Take 1 Tab by mouth every day. 90 Tab 3   • Multiple Vitamins-Minerals (MULTIVITAMIN PO) Take 1 Tab by mouth every day.       No current [facility]-ordered facility-administered medications on file.        Past Medical History:   Diagnosis Date   • Arthritis     back, knee   • Cellulitis of hand 2015   • Cold 2016    stomach flu   • Decreased GFR 2013   • Dental disorder     missing teeth   • GOUT 2013   • High cholesterol    • Hypercholesterolemia 8/15/2013   • Hypertension    • Iron deficiency anemia due to chronic blood loss 2016   • Obesity 2016   • Obesity (BMI 35.0-39.9 without comorbidity) 2015   • Pain     knees   • Psychiatric problem 2018    depression   • Scalp cyst 9/15/2016       Social History     Tobacco Use   • Smoking status: Former Smoker     Packs/day: 0.00     Types: Cigarettes     Last attempt to quit: 1990     Years since quittin.4   • Smokeless tobacco: Never Used   Substance Use Topics   • Alcohol use: No     Alcohol/week: 0.0 oz     Comment: quit 12 yrs ago, before that drank heavily   • Drug use: No       Family Status   Relation Name Status   • Mo     • Fa     • OTHER  (Not Specified)   • Bro  Alive     Family History   Problem Relation Age of Onset   • Heart Disease Mother    • Heart Disease Father    • Diabetes Father    • Hypertension Other    • Heart Disease Brother    • Hypertension Brother        ROS:  Review of Systems   Constitutional: Negative for fever, chills, weight loss and malaise/fatigue.   HENT: Negative for ear pain, nosebleeds, congestion, sore throat and neck pain.    Eyes:Positive for eye complaints as in HPI, positive for mild blurred vision.  "  Respiratory: Negative for cough, sputum production, shortness of breath and wheezing.    Cardiovascular: Negative for chest pain, palpitations, orthopnea and leg swelling.   Gastrointestinal: Negative for heartburn, nausea, vomiting and abdominal pain.   Genitourinary: Negative for dysuria, urgency and frequency.     Exam:  /62   Pulse 60   Temp 37.1 °C (98.7 °F)   Ht 1.562 m (5' 1.5\")   Wt 89.8 kg (198 lb)   SpO2 98%   General:  Well nourished, well developed female in NAD  Head:Normocephalic, atraumatic  Eyes: PERRLA, EOM within normal limits, She has mild right and mild to moderate left conjunctival injection, no scleral icterus, visual fields and acuity grossly intact.  Nose: Symmetrical without tenderness, no discharge.  Mouth: reasonable hygiene, no erythema exudates or tonsillar enlargement.  Neck: no masses, range of motion within normal limits, no tracheal deviation. No obvious thyroid enlargement.  Extremities: no clubbing, cyanosis, or edema.    Please note that this dictation was created using voice recognition software. I have made every reasonable attempt to correct obvious errors, but I expect that there are errors of grammar and possibly content that I did not discover before finalizing the note.    Assessment/Plan:  1. Allergic conjunctivitis of both eyes  olopatadine (PATANOL) 0.1 % ophthalmic solution       Followup with primary care in the next 7-10 days if not significantly improving, return to the urgent care or go to the emergency room sooner for any worsening of symptoms.       "

## 2020-06-15 ENCOUNTER — OFFICE VISIT (OUTPATIENT)
Dept: MEDICAL GROUP | Facility: PHYSICIAN GROUP | Age: 70
End: 2020-06-15
Payer: MEDICARE

## 2020-06-15 VITALS
BODY MASS INDEX: 36.44 KG/M2 | OXYGEN SATURATION: 95 % | WEIGHT: 198 LBS | RESPIRATION RATE: 14 BRPM | SYSTOLIC BLOOD PRESSURE: 126 MMHG | TEMPERATURE: 96.9 F | DIASTOLIC BLOOD PRESSURE: 72 MMHG | HEIGHT: 62 IN | HEART RATE: 59 BPM

## 2020-06-15 DIAGNOSIS — F43.9 STRESS AT HOME: ICD-10-CM

## 2020-06-15 DIAGNOSIS — R94.4 DECREASED GFR: ICD-10-CM

## 2020-06-15 DIAGNOSIS — H00.015 HORDEOLUM EXTERNUM OF LEFT LOWER EYELID: ICD-10-CM

## 2020-06-15 DIAGNOSIS — R25.1 TREMOR: ICD-10-CM

## 2020-06-15 PROCEDURE — 99213 OFFICE O/P EST LOW 20 MIN: CPT | Performed by: INTERNAL MEDICINE

## 2020-06-15 RX ORDER — GENTAMICIN SULFATE 3 MG/ML
1 SOLUTION/ DROPS OPHTHALMIC EVERY 4 HOURS
Qty: 1 BOTTLE | Refills: 0 | Status: SHIPPED | OUTPATIENT
Start: 2020-06-15 | End: 2020-09-30

## 2020-06-15 RX ORDER — MONTELUKAST SODIUM 10 MG/1
10 TABLET ORAL DAILY
Qty: 30 TAB | Refills: 3 | Status: SHIPPED | OUTPATIENT
Start: 2020-06-15 | End: 2020-10-05

## 2020-06-15 ASSESSMENT — FIBROSIS 4 INDEX: FIB4 SCORE: 1.63

## 2020-06-15 NOTE — PROGRESS NOTES
Chief Complaint   Patient presents with   • Eye Problem     sty and poss pink eye x 1 month       HISTORY OF PRESENT ILLNESS: Patient is a 69 y.o. female established patient who presents today to discuss the medical issues below.    Patient complaining of left eye tenderness.  She was seen in urgent care 2 weeks ago for bilateral eye redness without irritation per her report today.  She was placed on Patanol she states it did not seem to help much yesterday she noted redness tenderness swelling at the inferior eyelid on the left.  She cannot recall any exposure to a foreign body.  No fevers chills.  No crusting.  No visual changes.    Patient Active Problem List    Diagnosis Date Noted   • CHF with left ventricular diastolic dysfunction, NYHA class 1 (CMS-[facility]) 06/14/2015     Priority: High   • Decreased GFR 09/25/2013     Priority: High   • Cervical spine disease 03/22/2017     Priority: Medium   • Obesity (BMI 35.0-39.9 without comorbidity) ([facility]) 02/25/2015     Priority: Medium   • Hypercholesterolemia 08/15/2013     Priority: Medium   • Hypertension      Priority: Medium   • Hordeolum externum of left lower eyelid 06/15/2020   • Tremor 06/15/2020   • Other headache syndrome 02/11/2020   • Stress at home 06/04/2019   • Obesity (BMI 30-39.9) 02/16/2018   • Dysthymia 09/15/2017   • Right foot pain 03/22/2017   • Scalp cyst 09/15/2016   • Localized primary osteoarthritis of left lower leg 03/14/2016   • Localized primary osteoarthritis of lower leg 03/14/2016   • Dental caries 04/22/2015   • Chronic pain of left knee 08/29/2014   • Gout, chronic 07/16/2013   • Arthritis        Allergies:Amoxicillin; Percocet [apap-fd&c red #40 [place]-oxycodone]; and Tramadol    Current Outpatient Medications   Medication Sig Dispense Refill   • gentamicin (GARAMYCIN) 0.3 % Solution Place 1 Drop in both eyes every 4 hours. 1 Bottle 0   • montelukast (SINGULAIR) 10 MG Tab Take 1 Tab by mouth every day. 30 Tab 3   • celecoxib  (CELEBREX) 100 MG Cap Take 100 mg by mouth.     • lisinopril (PRINIVIL) 20 MG Tab Take 1 tablet by mouth once daily 90 Tab 3   • cyclobenzaprine (FLEXERIL) 10 MG Tab Take 1 Tab by mouth at bedtime as needed. 30 Tab 0   • escitalopram (LEXAPRO) 20 MG tablet TAKE 1 TABLET BY MOUTH ONCE DAILY 90 Tab 3   • lovastatin (MEVACOR) 10 MG tablet Take 1 Tab by mouth every day. 90 Tab 3   • allopurinol (ZYLOPRIM) 100 MG Tab Take 2 Tabs by mouth every day. 180 Tab 3   • Cholecalciferol (CVS VITAMIN D) 2000 UNIT Cap Take 1 Cap by mouth every bedtime. For vitamin D deficiency 90 Cap 3   • aspirin EC (ECOTRIN) 81 MG Tablet Delayed Response Take 1 Tab by mouth every day. 90 Tab 3   • Multiple Vitamins-Minerals (MULTIVITAMIN PO) Take 1 Tab by mouth every day.     • olopatadine (PATANOL) 0.1 % ophthalmic solution Place 1 Drop in both eyes 2 times a day. 5 mL 0   • cromolyn (OPTICROM) 4 % ophthalmic solution Place 2 Drops in both eyes 5 Times a Day. 10 mL 0     No current facility-administered medications for this visit.          Past Medical History:   Diagnosis Date   • Arthritis     back, knee   • Cellulitis of hand 2015   • Cold 2016    stomach flu   • Decreased GFR 2013   • Dental disorder     missing teeth   • GOUT 2013   • High cholesterol    • Hypercholesterolemia 8/15/2013   • Hypertension    • Iron deficiency anemia due to chronic blood loss 2016   • Obesity 2016   • Obesity (BMI 35.0-39.9 without comorbidity) 2015   • Pain     knees   • Psychiatric problem 2018    depression   • Scalp cyst 9/15/2016       Social History     Tobacco Use   • Smoking status: Former Smoker     Packs/day: 0.00     Types: Cigarettes     Last attempt to quit: 1990     Years since quittin.4   • Smokeless tobacco: Never Used   Substance Use Topics   • Alcohol use: No     Alcohol/week: 0.0 oz     Comment: quit 12 yrs ago, before that drank heavily   • Drug use: No       Family Status   Relation Name  "Status   • Mo     • Fa     • OTHER  (Not Specified)   • Bro  Alive     Family History   Problem Relation Age of Onset   • Heart Disease Mother    • Heart Disease Father    • Diabetes Father    • Hypertension Other    • Heart Disease Brother    • Hypertension Brother        ROS:    Respiratory: Negative for cough, sputum production, shortness of breath or wheezing.    Cardiovascular: Negative for chest pain, palpitations, orthopnea, dyspnea with exertion or edema.   Gastrointestinal: Negative for GI upset, nausea, vomiting, abdominal pain, constipation or diarrhea.   Genitourinary: Negative for dysuria, urgency, hesitancy or frequency.       Exam:    /72   Pulse (!) 59   Temp 36.1 °C (96.9 °F) (Temporal)   Resp 14   Ht 1.575 m (5' 2\")   Wt 89.8 kg (198 lb)   SpO2 95%  Body mass index is 36.21 kg/m².  General:  Well nourished, well developed female in NAD.  HENT: Inferior aspect of the left eyelid with erythema and edema approximately 6 mm in diameter.  No central pustule or drainage.  The eyelid margin is clear conjunctiva overlying the eye is clear no involvement of the iris.  Right eye with minimal diffuse conjunctival erythema  Neck: Supple without bruit. Thyroid is not enlarged.  Pulmonary: Clear to ausculation and percussion.  Normal effort. No rales, rhonchi, or wheezing.  Cardiovascular: Regular rate and rhythm without murmur.   Psych: Alert and oriented to person, place, and time. Appropriate mood and conversation.        This dictation was created using voice recognition software. I have made reasonable attempts to correct errors, however, errors of grammar and content may exist.          Assessment/Plan:    1. Hordeolum externum of left lower eyelid  Gentamicin eyedrops warm moist compresses to the left.  Monitor with Singulair PRN bilateral eye redness.    2. Decreased GFR  She is overdue for labs reminded her will schedule  - Comp Metabolic Panel; Future  - CBC WITH " DIFFERENTIAL; Future    3. Stress at home  She reports she is moved in with her other daughter and stress at home is much better.  Support    4. Tremor  Patient is complaining of increasing tremor times years.  Now limiting some activities of fine motion.  We will schedule labs and follow-up.  - TSH WITH REFLEX TO FT4; Future    Patient was seen for 20 minutes face to face of which more than 50% of the time was spent in counseling and coordination of care regarding the above problems.

## 2020-06-15 NOTE — PROGRESS NOTES
Chief Complaint   Patient presents with   • Eye Problem     sty and poss pink eye x 1 month       HISTORY OF PRESENT ILLNESS: Patient is a 69 y.o. female established patient who presents today to discuss the medical issues below.    No problem-specific Assessment & Plan notes found for this encounter.      Patient Active Problem List    Diagnosis Date Noted   • CHF with left ventricular diastolic dysfunction, NYHA class 1 (CMS-[facility]) 06/14/2015     Priority: High   • Decreased GFR 09/25/2013     Priority: High   • Cervical spine disease 03/22/2017     Priority: Medium   • Obesity (BMI 35.0-39.9 without comorbidity) ([facility]) 02/25/2015     Priority: Medium   • Hypercholesterolemia 08/15/2013     Priority: Medium   • Hypertension      Priority: Medium   • Other headache syndrome 02/11/2020   • Stress at home 06/04/2019   • Obesity (BMI 30-39.9) 02/16/2018   • Dysthymia 09/15/2017   • Right foot pain 03/22/2017   • Scalp cyst 09/15/2016   • Localized primary osteoarthritis of left lower leg 03/14/2016   • Localized primary osteoarthritis of lower leg 03/14/2016   • Dental caries 04/22/2015   • Chronic pain of left knee 08/29/2014   • Gout, chronic 07/16/2013   • Arthritis        Allergies:Amoxicillin; Percocet [apap-fd&c red #40 [place]-oxycodone]; and Tramadol    Current Outpatient Medications   Medication Sig Dispense Refill   • gentamicin (GARAMYCIN) 0.3 % Solution Place 1 Drop in both eyes every 4 hours. 1 Bottle 0   • montelukast (SINGULAIR) 10 MG Tab Take 1 Tab by mouth every day. 30 Tab 3   • celecoxib (CELEBREX) 100 MG Cap Take 100 mg by mouth.     • lisinopril (PRINIVIL) 20 MG Tab Take 1 tablet by mouth once daily 90 Tab 3   • cyclobenzaprine (FLEXERIL) 10 MG Tab Take 1 Tab by mouth at bedtime as needed. 30 Tab 0   • escitalopram (LEXAPRO) 20 MG tablet TAKE 1 TABLET BY MOUTH ONCE DAILY 90 Tab 3   • lovastatin (MEVACOR) 10 MG tablet Take 1 Tab by mouth every day. 90 Tab 3   • allopurinol (ZYLOPRIM) 100 MG Tab Take  2 Tabs by mouth every day. 180 Tab 3   • Cholecalciferol (CVS VITAMIN D) 2000 UNIT Cap Take 1 Cap by mouth every bedtime. For vitamin D deficiency 90 Cap 3   • aspirin EC (ECOTRIN) 81 MG Tablet Delayed Response Take 1 Tab by mouth every day. 90 Tab 3   • Multiple Vitamins-Minerals (MULTIVITAMIN PO) Take 1 Tab by mouth every day.     • olopatadine (PATANOL) 0.1 % ophthalmic solution Place 1 Drop in both eyes 2 times a day. 5 mL 0   • cromolyn (OPTICROM) 4 % ophthalmic solution Place 2 Drops in both eyes 5 Times a Day. 10 mL 0     No current facility-administered medications for this visit.          Past Medical History:   Diagnosis Date   • Arthritis     back, knee   • Cellulitis of hand 2015   • Cold 2016    stomach flu   • Decreased GFR 2013   • Dental disorder     missing teeth   • GOUT 2013   • High cholesterol    • Hypercholesterolemia 8/15/2013   • Hypertension    • Iron deficiency anemia due to chronic blood loss 2016   • Obesity 2016   • Obesity (BMI 35.0-39.9 without comorbidity) 2015   • Pain     knees   • Psychiatric problem 2018    depression   • Scalp cyst 9/15/2016       Social History     Tobacco Use   • Smoking status: Former Smoker     Packs/day: 0.00     Types: Cigarettes     Last attempt to quit: 1990     Years since quittin.4   • Smokeless tobacco: Never Used   Substance Use Topics   • Alcohol use: No     Alcohol/week: 0.0 oz     Comment: quit 12 yrs ago, before that drank heavily   • Drug use: No       Family Status   Relation Name Status   • Mo     • Fa     • OTHER  (Not Specified)   • Bro  Alive     Family History   Problem Relation Age of Onset   • Heart Disease Mother    • Heart Disease Father    • Diabetes Father    • Hypertension Other    • Heart Disease Brother    • Hypertension Brother        ROS:    Respiratory: Negative for cough, sputum production, shortness of breath or wheezing.    Cardiovascular: Negative for chest  "pain, palpitations, orthopnea, dyspnea with exertion or edema.   Gastrointestinal: Negative for GI upset, nausea, vomiting, abdominal pain, constipation or diarrhea.   Genitourinary: Negative for dysuria, urgency, hesitancy or frequency.       Exam:    /72   Pulse (!) 59   Temp 36.1 °C (96.9 °F) (Temporal)   Resp 14   Ht 1.575 m (5' 2\")   Wt 89.8 kg (198 lb)   SpO2 95%  Body mass index is 36.21 kg/m².  General:  Well nourished, well developed female in NAD.  HENT: Normocephalic, bilateral TMs are intact, nasal and oral mucosa with no lesions,   Neck: Supple without bruit. Thyroid is not enlarged.  Pulmonary: Clear to ausculation and percussion.  Normal effort. No rales, rhonchi, or wheezing.  Cardiovascular: Regular rate and rhythm without murmur.   Abdomen: Normal bowel sounds soft and nontender no palpable liver spleen bladder mass.  Extremities: No LE edema noted.  Neuro: Grossly nonfocal.  Psych: Alert and oriented to person, place, and time. Appropriate mood and conversation.    ***    This dictation was created using voice recognition software. I have made reasonable attempts to correct errors, however, errors of grammar and content may exist.          Assessment/Plan:    There are no diagnoses linked to this encounter.         "

## 2020-06-25 ENCOUNTER — TELEPHONE (OUTPATIENT)
Dept: MEDICAL GROUP | Facility: PHYSICIAN GROUP | Age: 70
End: 2020-06-25

## 2020-06-25 NOTE — TELEPHONE ENCOUNTER
VOICEMAIL  1. Caller Name: Virginia Nini Harrison                        Call Back Number: [phone] (home)       2. Message: pt called and stated that she was dizzy and her B/P was elevated .    I called [phone] (home)   And it is not a working number.   Called   [phone] and there is not  set up.     3. Patient approves office to leave a detailed voicemail/MyChart message: no

## 2020-07-06 DIAGNOSIS — M10.9 GOUT, UNSPECIFIED CAUSE, UNSPECIFIED CHRONICITY, UNSPECIFIED SITE: ICD-10-CM

## 2020-07-06 DIAGNOSIS — E78.00 HYPERCHOLESTEROLEMIA: ICD-10-CM

## 2020-07-07 RX ORDER — ALLOPURINOL 100 MG/1
TABLET ORAL
Qty: 180 TAB | Refills: 0 | Status: SHIPPED | OUTPATIENT
Start: 2020-07-07 | End: 2020-10-05

## 2020-07-07 RX ORDER — LOVASTATIN 10 MG/1
TABLET ORAL
Qty: 90 TAB | Refills: 0 | Status: SHIPPED | OUTPATIENT
Start: 2020-07-07 | End: 2020-10-05

## 2020-08-22 NOTE — ASSESSMENT & PLAN NOTE
Dr. Shi, Sr. Rolon, Dr. Mora notified of last 8 hour urine output and stool output. 1300 ml of liquid sool, 125 ml urine output. LR fluid bolus ordered.   Lots of stress at home, daughters living at home, last night asked son in law to move out.

## 2020-09-29 ENCOUNTER — HOSPITAL ENCOUNTER (OUTPATIENT)
Dept: LAB | Facility: MEDICAL CENTER | Age: 70
End: 2020-09-29
Attending: INTERNAL MEDICINE
Payer: MEDICARE

## 2020-09-29 ENCOUNTER — TELEPHONE (OUTPATIENT)
Dept: MEDICAL GROUP | Facility: PHYSICIAN GROUP | Age: 70
End: 2020-09-29

## 2020-09-29 DIAGNOSIS — R94.4 DECREASED GFR: ICD-10-CM

## 2020-09-29 DIAGNOSIS — R25.1 TREMOR: ICD-10-CM

## 2020-09-29 LAB
ALBUMIN SERPL BCP-MCNC: 4.1 G/DL (ref 3.2–4.9)
ALBUMIN/GLOB SERPL: 1.4 G/DL
ALP SERPL-CCNC: 149 U/L (ref 30–99)
ALT SERPL-CCNC: 18 U/L (ref 2–50)
ANION GAP SERPL CALC-SCNC: 12 MMOL/L (ref 7–16)
AST SERPL-CCNC: 26 U/L (ref 12–45)
BASOPHILS # BLD AUTO: 0.7 % (ref 0–1.8)
BASOPHILS # BLD: 0.06 K/UL (ref 0–0.12)
BILIRUB SERPL-MCNC: 0.6 MG/DL (ref 0.1–1.5)
BUN SERPL-MCNC: 18 MG/DL (ref 8–22)
CALCIUM SERPL-MCNC: 10.3 MG/DL (ref 8.5–10.5)
CHLORIDE SERPL-SCNC: 102 MMOL/L (ref 96–112)
CO2 SERPL-SCNC: 25 MMOL/L (ref 20–33)
CREAT SERPL-MCNC: 1.1 MG/DL (ref 0.5–1.4)
EOSINOPHIL # BLD AUTO: 0.54 K/UL (ref 0–0.51)
EOSINOPHIL NFR BLD: 6.4 % (ref 0–6.9)
ERYTHROCYTE [DISTWIDTH] IN BLOOD BY AUTOMATED COUNT: 47.1 FL (ref 35.9–50)
FASTING STATUS PATIENT QL REPORTED: NORMAL
GLOBULIN SER CALC-MCNC: 3 G/DL (ref 1.9–3.5)
GLUCOSE SERPL-MCNC: 85 MG/DL (ref 65–99)
HCT VFR BLD AUTO: 48.7 % (ref 37–47)
HGB BLD-MCNC: 15.7 G/DL (ref 12–16)
IMM GRANULOCYTES # BLD AUTO: 0.03 K/UL (ref 0–0.11)
IMM GRANULOCYTES NFR BLD AUTO: 0.4 % (ref 0–0.9)
LYMPHOCYTES # BLD AUTO: 1.73 K/UL (ref 1–4.8)
LYMPHOCYTES NFR BLD: 20.4 % (ref 22–41)
MCH RBC QN AUTO: 29.2 PG (ref 27–33)
MCHC RBC AUTO-ENTMCNC: 32.2 G/DL (ref 33.6–35)
MCV RBC AUTO: 90.5 FL (ref 81.4–97.8)
MONOCYTES # BLD AUTO: 0.5 K/UL (ref 0–0.85)
MONOCYTES NFR BLD AUTO: 5.9 % (ref 0–13.4)
NEUTROPHILS # BLD AUTO: 5.63 K/UL (ref 2–7.15)
NEUTROPHILS NFR BLD: 66.2 % (ref 44–72)
NRBC # BLD AUTO: 0 K/UL
NRBC BLD-RTO: 0 /100 WBC
PLATELET # BLD AUTO: 226 K/UL (ref 164–446)
PMV BLD AUTO: 12.2 FL (ref 9–12.9)
POTASSIUM SERPL-SCNC: 3.9 MMOL/L (ref 3.6–5.5)
PROT SERPL-MCNC: 7.1 G/DL (ref 6–8.2)
RBC # BLD AUTO: 5.38 M/UL (ref 4.2–5.4)
SODIUM SERPL-SCNC: 139 MMOL/L (ref 135–145)
TSH SERPL DL<=0.005 MIU/L-ACNC: 3.42 UIU/ML (ref 0.38–5.33)
WBC # BLD AUTO: 8.5 K/UL (ref 4.8–10.8)

## 2020-09-29 PROCEDURE — 84443 ASSAY THYROID STIM HORMONE: CPT

## 2020-09-29 PROCEDURE — 80053 COMPREHEN METABOLIC PANEL: CPT

## 2020-09-29 PROCEDURE — 85025 COMPLETE CBC W/AUTO DIFF WBC: CPT

## 2020-09-29 PROCEDURE — 36415 COLL VENOUS BLD VENIPUNCTURE: CPT

## 2020-09-29 NOTE — TELEPHONE ENCOUNTER
Pts daughter is calling stating she has misplaced Virginias escitalopram. Would you like me to call the pharmacy for a refill? She said she is willing to pay cash for the medication.        Received request via: Patient     Was the patient seen in the last year in this department? Yes    Does the patient have an active prescription (recently filled or refills available) for medication(s) requested? No

## 2020-09-30 ENCOUNTER — OFFICE VISIT (OUTPATIENT)
Dept: MEDICAL GROUP | Facility: PHYSICIAN GROUP | Age: 70
End: 2020-09-30
Payer: MEDICARE

## 2020-09-30 VITALS
HEIGHT: 61 IN | DIASTOLIC BLOOD PRESSURE: 99 MMHG | HEART RATE: 60 BPM | BODY MASS INDEX: 38.71 KG/M2 | TEMPERATURE: 97.2 F | OXYGEN SATURATION: 94 % | RESPIRATION RATE: 14 BRPM | WEIGHT: 205 LBS | SYSTOLIC BLOOD PRESSURE: 138 MMHG

## 2020-09-30 DIAGNOSIS — M48.9 CERVICAL SPINE DISEASE: ICD-10-CM

## 2020-09-30 DIAGNOSIS — N95.1 MENOPAUSAL STATE: ICD-10-CM

## 2020-09-30 DIAGNOSIS — F32.9 REACTIVE DEPRESSION: ICD-10-CM

## 2020-09-30 DIAGNOSIS — Z78.0 POSTMENOPAUSAL STATUS (AGE-RELATED) (NATURAL): ICD-10-CM

## 2020-09-30 DIAGNOSIS — H57.89 EYE IRRITATION: ICD-10-CM

## 2020-09-30 DIAGNOSIS — Z23 NEED FOR VACCINATION: ICD-10-CM

## 2020-09-30 DIAGNOSIS — E78.00 HYPERCHOLESTEROLEMIA: ICD-10-CM

## 2020-09-30 DIAGNOSIS — I10 ESSENTIAL HYPERTENSION: ICD-10-CM

## 2020-09-30 DIAGNOSIS — Z12.31 ENCOUNTER FOR SCREENING MAMMOGRAM FOR BREAST CANCER: ICD-10-CM

## 2020-09-30 DIAGNOSIS — E66.9 OBESITY (BMI 35.0-39.9 WITHOUT COMORBIDITY): ICD-10-CM

## 2020-09-30 PROCEDURE — G0008 ADMIN INFLUENZA VIRUS VAC: HCPCS | Performed by: INTERNAL MEDICINE

## 2020-09-30 PROCEDURE — 99214 OFFICE O/P EST MOD 30 MIN: CPT | Mod: 25 | Performed by: INTERNAL MEDICINE

## 2020-09-30 PROCEDURE — 90662 IIV NO PRSV INCREASED AG IM: CPT | Performed by: INTERNAL MEDICINE

## 2020-09-30 ASSESSMENT — FIBROSIS 4 INDEX: FIB4 SCORE: 1.95

## 2020-09-30 NOTE — ASSESSMENT & PLAN NOTE
Patient reports she has not been taking the celebrex, she thinks she just hasn't gotten that refill.

## 2020-09-30 NOTE — PROGRESS NOTES
Chief Complaint   Patient presents with   • Lab Results   • Anxiety       HISTORY OF PRESENT ILLNESS: Patient is a 70 y.o. female established patient who presents today to discuss the medical issues below.    Reactive depression  Patient reports her daughter lost her pills.  Patient thinks they got thrown out with cleaning.  Patient has purchased the lexapro. She reports her daughter feels she needs counseling due to having had a hard life.      Cervical spine disease  Patient reports she has not been taking the celebrex, she thinks she just hasn't gotten that refill.     Obesity (BMI 35.0-39.9 without comorbidity) ([facility])  Patient's weight is up    Hypercholesterolemia  Continues on the lovastatin, had labs, weight is up and not following exercise or diet.      Eye irritation  Patient continues to complain of dry red eyes, uses OTC meds, the Opticrom and patanol helped but she is out of those.        Patient Active Problem List    Diagnosis Date Noted   • CHF with left ventricular diastolic dysfunction, NYHA class 1 (CMS-[facility]) 06/14/2015     Priority: High   • Decreased GFR 09/25/2013     Priority: High   • Cervical spine disease 03/22/2017     Priority: Medium   • Obesity (BMI 35.0-39.9 without comorbidity) ([facility]) 02/25/2015     Priority: Medium   • Hypercholesterolemia 08/15/2013     Priority: Medium   • Hypertension      Priority: Medium   • Eye irritation 09/30/2020   • Hordeolum externum of left lower eyelid 06/15/2020   • Tremor 06/15/2020   • Other headache syndrome 02/11/2020   • Stress at home 06/04/2019   • Obesity (BMI 30-39.9) 02/16/2018   • Reactive depression 09/15/2017   • Right foot pain 03/22/2017   • Scalp cyst 09/15/2016   • Localized primary osteoarthritis of left lower leg 03/14/2016   • Localized primary osteoarthritis of lower leg 03/14/2016   • Dental caries 04/22/2015   • Chronic pain of left knee 08/29/2014   • Gout, chronic 07/16/2013   • Arthritis        Allergies:Amoxicillin, Percocet  [apap-fd&c red #40 [place]-oxycodone], and Tramadol    Current Outpatient Medications   Medication Sig Dispense Refill   • Zoster Vac Recomb Adjuvanted (SHINGRIX) 50 MCG/0.5ML Recon Susp 0.5 mL by Intramuscular route Once for 1 dose. 0.5 mL 0   • allopurinol (ZYLOPRIM) 100 MG Tab Take 2 tablets by mouth once daily 180 Tab 0   • lovastatin (MEVACOR) 10 MG tablet Take 1 tablet by mouth once daily 90 Tab 0   • montelukast (SINGULAIR) 10 MG Tab Take 1 Tab by mouth every day. 30 Tab 3   • olopatadine (PATANOL) 0.1 % ophthalmic solution Place 1 Drop in both eyes 2 times a day. 5 mL 0   • lisinopril (PRINIVIL) 20 MG Tab Take 1 tablet by mouth once daily 90 Tab 3   • escitalopram (LEXAPRO) 20 MG tablet TAKE 1 TABLET BY MOUTH ONCE DAILY 90 Tab 3   • Cholecalciferol ([employer] VITAMIN D) 2000 UNIT Cap Take 1 Cap by mouth every bedtime. For vitamin D deficiency 90 Cap 3   • aspirin EC (ECOTRIN) 81 MG Tablet Delayed Response Take 1 Tab by mouth every day. 90 Tab 3   • Multiple Vitamins-Minerals (MULTIVITAMIN PO) Take 1 Tab by mouth every day.     • cromolyn (OPTICROM) 4 % ophthalmic solution Place 2 Drops in both eyes 5 Times a Day. (Patient not taking: Reported on 9/30/2020) 10 mL 0     No current facility-administered medications for this visit.          Past Medical History:   Diagnosis Date   • Arthritis     back, knee   • Cellulitis of hand 4/22/2015   • Cold 2/17/2016    stomach flu   • Decreased GFR 9/25/2013   • Dental disorder     missing teeth   • GOUT 7/16/2013   • High cholesterol    • Hypercholesterolemia 8/15/2013   • Hypertension    • Iron deficiency anemia due to chronic blood loss 6/7/2016   • Obesity 6/7/2016   • Obesity (BMI 35.0-39.9 without comorbidity) 2/25/2015   • Pain     knees   • Psychiatric problem 09/07/2018    depression   • Scalp cyst 9/15/2016       Social History     Tobacco Use   • Smoking status: Former Smoker     Packs/day: 0.00     Types: Cigarettes     Quit date: 1/7/1990     Years since  "quittin.7   • Smokeless tobacco: Never Used   Substance Use Topics   • Alcohol use: No     Alcohol/week: 0.0 oz     Comment: quit 12 yrs ago, before that drank heavily   • Drug use: No       Family Status   Relation Name Status   • Mo     • Fa     • OTHER  (Not Specified)   • Bro  Alive     Family History   Problem Relation Age of Onset   • Heart Disease Mother    • Heart Disease Father    • Diabetes Father    • Hypertension Other    • Heart Disease Brother    • Hypertension Brother        ROS:    Respiratory: Negative for cough, sputum production, shortness of breath or wheezing.    Cardiovascular: Negative for chest pain, palpitations, orthopnea, dyspnea with exertion or edema.   Gastrointestinal: Negative for GI upset, nausea, vomiting, abdominal pain, constipation or diarrhea.   Genitourinary: Negative for dysuria, urgency, hesitancy or frequency.       Exam:    /99 (BP Location: Left arm, Patient Position: Sitting, BP Cuff Size: Adult)   Pulse 60   Temp 36.2 °C (97.2 °F) (Temporal)   Resp 14   Ht 1.549 m (5' 1\")   Wt 93 kg (205 lb)   SpO2 94%  Body mass index is 38.73 kg/m².  General:  Well nourished, well developed female in NAD.  HENT: Eye exam with minimal diffuse erythema no lesions  Pulmonary: Clear to ausculation and percussion.  Normal effort. No rales, rhonchi, or wheezing.  Cardiovascular: Regular rate and rhythm without murmur.   Abdomen: Normal bowel sounds soft and nontender no palpable liver spleen bladder mass.  Extremities: No LE edema noted.  Neuro: Grossly nonfocal.  Psych: Alert and oriented to person, place, and time. Appropriate mood and conversation.    LABS: Results reviewed and discussed with the patient, questions answered.      This dictation was created using voice recognition software. I have made reasonable attempts to correct errors, however, errors of grammar and content may exist.          Assessment/Plan:    1. Need for vaccination    - Zoster Vac " Recomb Adjuvanted (SHINGRIX) 50 MCG/0.5ML Recon Susp; 0.5 mL by Intramuscular route Once for 1 dose.  Dispense: 0.5 mL; Refill: 0  - Influenza Vaccine, High Dose (65+ Only)    2. Postmenopausal status (age-related) (natural)  Bone density recommendation  - DS-BONE DENSITY STUDY (DEXA)    3. Menopausal state  Bone density recommendation  - DS-BONE DENSITY STUDY (DEXA)    4. Encounter for screening mammogram for breast cancer  Mammogram surveillance  - MA-SCREENING MAMMO BILAT W/CAD; Future    5. Reactive depression  Main issue.  Patient with minimal insight.  Referral to behavioral health for counseling options including tele-med discussed.  She like to consider going to [employer].  Multiple issues of prior stresses suggestive of posttraumatic stress support no evidence of suicidal ideation.  - REFERRAL TO BEHAVIORAL HEALTH    6. Cervical spine disease  Does not seem to improve or worsen with or without the Celebrex.  Considering renal we will monitor now off medications, exercise may be helpful for her    7. Obesity (BMI 35.0-39.9 without comorbidity) ([facility])  Unchanged proceed to support manage depression issues    8. Hypercholesterolemia  Ongoing lipid managing and monitoring  - Lipid Profile; Future    9. Eye irritation  Diffuse irritation of the eye.  Appears component of allergy.  Referral back to ophthalmology.  She has never had an ophthalmology examination cannot entirely exclude glaucoma complement.  Discussed with patient.  - AMB REFERRAL TO NEURO OPHTHALMOLOGY    10. Essential hypertension  Stable on meds ongoing laboratory monitoring  - Comp Metabolic Panel; Future  - CBC WITH DIFFERENTIAL; Future       Patient was seen for 25 minutes face to face of which more than 50% of the time was spent in counseling and coordination of care regarding the above problems.

## 2020-09-30 NOTE — ASSESSMENT & PLAN NOTE
Patient continues to complain of dry red eyes, uses OTC meds, the Opticrom and patanol helped but she is out of those.

## 2020-09-30 NOTE — ASSESSMENT & PLAN NOTE
Patient reports her daughter lost her pills.  Patient thinks they got thrown out with cleaning.  Patient has purchased the lexapro. She reports her daughter feels she needs counseling due to having had a hard life.

## 2021-03-30 ENCOUNTER — HOSPITAL ENCOUNTER (OUTPATIENT)
Dept: LAB | Facility: MEDICAL CENTER | Age: 71
End: 2021-03-30
Attending: INTERNAL MEDICINE
Payer: MEDICARE

## 2021-03-30 DIAGNOSIS — E78.00 HYPERCHOLESTEROLEMIA: ICD-10-CM

## 2021-03-30 DIAGNOSIS — I10 ESSENTIAL HYPERTENSION: ICD-10-CM

## 2021-03-30 PROCEDURE — 80053 COMPREHEN METABOLIC PANEL: CPT

## 2021-03-30 PROCEDURE — 36415 COLL VENOUS BLD VENIPUNCTURE: CPT

## 2021-03-30 PROCEDURE — 85025 COMPLETE CBC W/AUTO DIFF WBC: CPT

## 2021-03-30 PROCEDURE — 80061 LIPID PANEL: CPT

## 2021-03-31 LAB
ALBUMIN SERPL BCP-MCNC: 3.9 G/DL (ref 3.2–4.9)
ALBUMIN/GLOB SERPL: 1.2 G/DL
ALP SERPL-CCNC: 133 U/L (ref 30–99)
ALT SERPL-CCNC: 16 U/L (ref 2–50)
ANION GAP SERPL CALC-SCNC: 10 MMOL/L (ref 7–16)
AST SERPL-CCNC: 23 U/L (ref 12–45)
BASOPHILS # BLD AUTO: 0.7 % (ref 0–1.8)
BASOPHILS # BLD: 0.06 K/UL (ref 0–0.12)
BILIRUB SERPL-MCNC: 0.6 MG/DL (ref 0.1–1.5)
BUN SERPL-MCNC: 12 MG/DL (ref 8–22)
CALCIUM SERPL-MCNC: 9.6 MG/DL (ref 8.5–10.5)
CHLORIDE SERPL-SCNC: 107 MMOL/L (ref 96–112)
CHOLEST SERPL-MCNC: 146 MG/DL (ref 100–199)
CO2 SERPL-SCNC: 28 MMOL/L (ref 20–33)
CREAT SERPL-MCNC: 1.04 MG/DL (ref 0.5–1.4)
EOSINOPHIL # BLD AUTO: 0.45 K/UL (ref 0–0.51)
EOSINOPHIL NFR BLD: 5.4 % (ref 0–6.9)
ERYTHROCYTE [DISTWIDTH] IN BLOOD BY AUTOMATED COUNT: 46.5 FL (ref 35.9–50)
FASTING STATUS PATIENT QL REPORTED: NORMAL
GLOBULIN SER CALC-MCNC: 3.2 G/DL (ref 1.9–3.5)
GLUCOSE SERPL-MCNC: 93 MG/DL (ref 65–99)
HCT VFR BLD AUTO: 46.3 % (ref 37–47)
HDLC SERPL-MCNC: 38 MG/DL
HGB BLD-MCNC: 14.6 G/DL (ref 12–16)
IMM GRANULOCYTES # BLD AUTO: 0.03 K/UL (ref 0–0.11)
IMM GRANULOCYTES NFR BLD AUTO: 0.4 % (ref 0–0.9)
LDLC SERPL CALC-MCNC: 83 MG/DL
LYMPHOCYTES # BLD AUTO: 1.74 K/UL (ref 1–4.8)
LYMPHOCYTES NFR BLD: 20.9 % (ref 22–41)
MCH RBC QN AUTO: 28.5 PG (ref 27–33)
MCHC RBC AUTO-ENTMCNC: 31.5 G/DL (ref 33.6–35)
MCV RBC AUTO: 90.3 FL (ref 81.4–97.8)
MONOCYTES # BLD AUTO: 0.57 K/UL (ref 0–0.85)
MONOCYTES NFR BLD AUTO: 6.8 % (ref 0–13.4)
NEUTROPHILS # BLD AUTO: 5.49 K/UL (ref 2–7.15)
NEUTROPHILS NFR BLD: 65.8 % (ref 44–72)
NRBC # BLD AUTO: 0 K/UL
NRBC BLD-RTO: 0 /100 WBC
PLATELET # BLD AUTO: 265 K/UL (ref 164–446)
PMV BLD AUTO: 11.6 FL (ref 9–12.9)
POTASSIUM SERPL-SCNC: 3.9 MMOL/L (ref 3.6–5.5)
PROT SERPL-MCNC: 7.1 G/DL (ref 6–8.2)
RBC # BLD AUTO: 5.13 M/UL (ref 4.2–5.4)
SODIUM SERPL-SCNC: 145 MMOL/L (ref 135–145)
TRIGL SERPL-MCNC: 124 MG/DL (ref 0–149)
WBC # BLD AUTO: 8.3 K/UL (ref 4.8–10.8)

## 2021-04-05 ENCOUNTER — OFFICE VISIT (OUTPATIENT)
Dept: MEDICAL GROUP | Facility: PHYSICIAN GROUP | Age: 71
End: 2021-04-05
Payer: MEDICARE

## 2021-04-05 VITALS
HEIGHT: 61 IN | WEIGHT: 206 LBS | HEART RATE: 63 BPM | DIASTOLIC BLOOD PRESSURE: 90 MMHG | RESPIRATION RATE: 16 BRPM | TEMPERATURE: 96.7 F | OXYGEN SATURATION: 96 % | BODY MASS INDEX: 38.89 KG/M2 | SYSTOLIC BLOOD PRESSURE: 130 MMHG

## 2021-04-05 DIAGNOSIS — M17.12 LOCALIZED PRIMARY OSTEOARTHRITIS OF LEFT LOWER LEG: ICD-10-CM

## 2021-04-05 DIAGNOSIS — R94.4 DECREASED GFR: ICD-10-CM

## 2021-04-05 DIAGNOSIS — I10 ESSENTIAL HYPERTENSION: ICD-10-CM

## 2021-04-05 DIAGNOSIS — E66.9 OBESITY (BMI 35.0-39.9 WITHOUT COMORBIDITY): ICD-10-CM

## 2021-04-05 DIAGNOSIS — M17.10 LOCALIZED PRIMARY OSTEOARTHRITIS OF LOWER LEG, UNSPECIFIED LATERALITY: ICD-10-CM

## 2021-04-05 DIAGNOSIS — M25.531 RIGHT WRIST PAIN: ICD-10-CM

## 2021-04-05 PROCEDURE — 99214 OFFICE O/P EST MOD 30 MIN: CPT | Performed by: INTERNAL MEDICINE

## 2021-04-05 RX ORDER — ESCITALOPRAM OXALATE 20 MG/1
20 TABLET ORAL DAILY
Qty: 90 TABLET | Refills: 3 | Status: SHIPPED | OUTPATIENT
Start: 2021-04-05 | End: 2022-01-11 | Stop reason: SDUPTHER

## 2021-04-05 RX ORDER — LISINOPRIL 20 MG/1
20 TABLET ORAL DAILY
Qty: 90 TABLET | Refills: 3 | Status: SHIPPED | OUTPATIENT
Start: 2021-04-05 | End: 2022-01-11 | Stop reason: SDUPTHER

## 2021-04-05 RX ORDER — HYDROCHLOROTHIAZIDE 25 MG/1
25 TABLET ORAL DAILY
Qty: 90 TABLET | Refills: 1 | Status: SHIPPED | OUTPATIENT
Start: 2021-04-05 | End: 2021-10-27

## 2021-04-05 ASSESSMENT — FIBROSIS 4 INDEX: FIB4 SCORE: 1.56

## 2021-04-05 NOTE — PATIENT INSTRUCTIONS
Work on low salt  Add hydrochlorothiazide 25 mg 1 in am (swelling and bp)  Parking plackard  Labs and follow in 3 mos  Tylenol if sore wrist  If persists let me know and I will send you to Orthopedics    Still need to avoid ibuprofen or aleve.   Continue the Lexapro    Bring a list about questions to all visits.

## 2021-04-05 NOTE — ASSESSMENT & PLAN NOTE
Patient reports right wrist pain.  She reports lateral wrist sore, she cannot tell how long this has been going on, no trauma, estimates about 1 week. She states worse if she twists her wrist such as opening a door.  She has not noted help with tylenol.

## 2021-04-05 NOTE — ASSESSMENT & PLAN NOTE
Patient reports she was seen at [facility] about 2 weeks for high bp.  She received an injection and was sent home.  We do not have records.  She did not have medication changes.  Paramedics got 220/90.  She reports she has no idea why bp went up.  She reports she is following at home but did not bring a record.  She reports 140-180/90 range. She continues with the lisinopril 20 mg a day.  She reports ongoing episodic lite headed, vague chest discomfort on the right shoulder, no syncope, no dyspnea.  She reports her home machine is a wrist machine.

## 2021-04-05 NOTE — PROGRESS NOTES
Chief Complaint   Patient presents with   • Lab Follow-up       HISTORY OF PRESENT ILLNESS: Patient is a 70 y.o. female established patient who presents today to discuss the medical issues below.    Decreased GFR  Improving with discontinuation of the NSAIDS.      Hypertension  Patient reports she was seen at [facility] about 2 weeks for high bp.  She received an injection and was sent home.  We do not have records.  She did not have medication changes.  Paramedics got 220/90.  She reports she has no idea why bp went up.  She reports she is following at home but did not bring a record.  She reports 140-180/90 range. She continues with the lisinopril 20 mg a day.  She reports ongoing episodic lite headed, vague chest discomfort on the right shoulder, no syncope, no dyspnea.  She reports her home machine is a wrist machine.      Obesity (BMI 35.0-39.9 without comorbidity) ([facility])  Weight is up a bit.     Right wrist pain  Patient reports right wrist pain.  She reports lateral wrist sore, she cannot tell how long this has been going on, no trauma, estimates about 1 week. She states worse if she twists her wrist such as opening a door.  She has not noted help with tylenol.        Patient Active Problem List    Diagnosis Date Noted   • CHF with left ventricular diastolic dysfunction, NYHA class 1 (CMS-[facility]) 06/14/2015   • Decreased GFR 09/25/2013   • Cervical spine disease 03/22/2017   • Obesity (BMI 35.0-39.9 without comorbidity) ([facility]) 02/25/2015   • Hypercholesterolemia 08/15/2013   • Hypertension    • Right wrist pain 04/05/2021   • Eye irritation 09/30/2020   • Hordeolum externum of left lower eyelid 06/15/2020   • Tremor 06/15/2020   • Other headache syndrome 02/11/2020   • Stress at home 06/04/2019   • Obesity (BMI 30-39.9) 02/16/2018   • Reactive depression 09/15/2017   • Right foot pain 03/22/2017   • Scalp cyst 09/15/2016   • Localized primary osteoarthritis of left lower leg 03/14/2016   • Localized primary  osteoarthritis of lower leg 2016   • Dental caries 2015   • Chronic pain of left knee 2014   • Gout, chronic 2013   • Arthritis        Allergies:Amoxicillin, Percocet [apap-fd&c red #40 [place]-oxycodone], and Tramadol    Current Outpatient Medications   Medication Sig Dispense Refill   • hydroCHLOROthiazide (HYDRODIURIL) 25 MG Tab Take 1 tablet by mouth every day. 90 tablet 1   • escitalopram (LEXAPRO) 20 MG tablet Take 1 tablet by mouth every day. 90 tablet 3   • lisinopril (PRINIVIL) 20 MG Tab Take 1 tablet by mouth every day. 90 tablet 3   • Cholecalciferol (VITAMIN D) 50 MCG (2000 UT) Cap TAKE 1 CAPSULE BY MOUTH AT BEDTIME FOR  VITAMIN  D  DEFICENCY 90 Cap 3   • lovastatin (MEVACOR) 10 MG tablet Take 1 tablet by mouth once daily 90 Tab 3   • montelukast (SINGULAIR) 10 MG Tab Take 1 tablet by mouth once daily 90 Tab 3   • allopurinol (ZYLOPRIM) 100 MG Tab Take 2 tablets by mouth once daily 180 Tab 3   • aspirin EC (ECOTRIN) 81 MG Tablet Delayed Response Take 1 Tab by mouth every day. 90 Tab 3   • Multiple Vitamins-Minerals (MULTIVITAMIN PO) Take 1 Tab by mouth every day.       No current facility-administered medications for this visit.         Past Medical History:   Diagnosis Date   • Arthritis     back, knee   • Cellulitis of hand 2015   • Cold 2016    stomach flu   • Decreased GFR 2013   • Dental disorder     missing teeth   • GOUT 2013   • High cholesterol    • Hypercholesterolemia 8/15/2013   • Hypertension    • Iron deficiency anemia due to chronic blood loss 2016   • Obesity 2016   • Obesity (BMI 35.0-39.9 without comorbidity) 2015   • Pain     knees   • Psychiatric problem 2018    depression   • Scalp cyst 9/15/2016       Social History     Tobacco Use   • Smoking status: Former Smoker     Packs/day: 0.00     Types: Cigarettes     Quit date: 1990     Years since quittin.2   • Smokeless tobacco: Never Used   Substance Use Topics   •  "Alcohol use: No     Alcohol/week: 0.0 oz     Comment: quit 12 yrs ago, before that drank heavily   • Drug use: No       Family Status   Relation Name Status   • Mo     • Fa     • OTHER  (Not Specified)   • Bro  Alive     Family History   Problem Relation Age of Onset   • Heart Disease Mother    • Heart Disease Father    • Diabetes Father    • Hypertension Other    • Heart Disease Brother    • Hypertension Brother        ROS:    Respiratory: Negative for cough, sputum production, shortness of breath or wheezing.    Cardiovascular: Negative for chest pain, palpitations, orthopnea, dyspnea with exertion or edema.   Gastrointestinal: Negative for GI upset, nausea, vomiting, abdominal pain, constipation or diarrhea.   Genitourinary: Negative for dysuria, urgency, hesitancy or frequency.       Exam:    /90 (BP Location: Right arm, Patient Position: Sitting, BP Cuff Size: Adult long)   Pulse 63   Temp 35.9 °C (96.7 °F) (Temporal)   Resp 16   Ht 1.549 m (5' 1\")   Wt 93.4 kg (206 lb)   SpO2 96%  Body mass index is 38.92 kg/m².  General:  Well nourished, well developed female in NAD.  Pulmonary: Clear to ausculation and percussion.  Normal effort. No rales, rhonchi, or wheezing.  Cardiovascular: Regular rate and rhythm without murmur.   Abdomen: Normal bowel sounds soft and nontender no palpable liver spleen bladder mass.  Extremities: No LE edema noted.  Neuro: Grossly nonfocal.  Psych: Alert and oriented to person, place, and time. Appropriate mood and conversation.    LABS: Results reviewed and discussed with the patient, questions answered.    This dictation was created using voice recognition software. I have made reasonable attempts to correct errors, however, errors of grammar and content may exist.          Assessment/Plan:    1. Decreased GFR  Much improved avoiding NSAIDS, reviewed, discussed ongoing monitoring .      2. Essential hypertension  Variable, we do not have hospital records, " patient is complaining of intermittent ankle swelling.  She is having trouble avoiding salt.  We will go ahead and add low-dose diuretic even though her examination today does not show significant edema.  This may be contributing to elevated blood pressure readings.  - Comp Metabolic Panel; Future  - CBC WITH DIFFERENTIAL; Future    3. Obesity (BMI 35.0-39.9 without comorbidity) ([facility])  Discussed diet, exercise, weight loss, behavioral modification, portion size management.      4. Right wrist pain  No signs of acute inflammation most likely a strain she has known degenerative arthritis of her knees wrist etc. monitor with Tylenol splinting consider orthopedic referral.      5.  Bilateral knee pain  Problematic for years.  She is unable to take nonsteroidals currently secondary to renal insufficiency.  Referral back to orthopedics discussed again.  Referral placed    Patient was seen for 25 minutes face to face of which more than 50% of the time was spent in counseling and coordination of care regarding the above problems.

## 2021-10-25 DIAGNOSIS — M10.9 GOUT, UNSPECIFIED CAUSE, UNSPECIFIED CHRONICITY, UNSPECIFIED SITE: ICD-10-CM

## 2021-10-25 DIAGNOSIS — E78.00 HYPERCHOLESTEROLEMIA: ICD-10-CM

## 2021-10-27 RX ORDER — HYDROCHLOROTHIAZIDE 25 MG/1
TABLET ORAL
Qty: 90 TABLET | Refills: 0 | Status: SHIPPED | OUTPATIENT
Start: 2021-10-27 | End: 2022-01-11 | Stop reason: SDUPTHER

## 2021-10-27 RX ORDER — MONTELUKAST SODIUM 10 MG/1
TABLET ORAL
Qty: 90 TABLET | Refills: 0 | Status: SHIPPED | OUTPATIENT
Start: 2021-10-27 | End: 2022-01-11 | Stop reason: SDUPTHER

## 2021-10-27 RX ORDER — ALLOPURINOL 100 MG/1
TABLET ORAL
Qty: 180 TABLET | Refills: 0 | Status: SHIPPED | OUTPATIENT
Start: 2021-10-27 | End: 2022-01-11 | Stop reason: SDUPTHER

## 2021-10-27 RX ORDER — LOVASTATIN 10 MG/1
TABLET ORAL
Qty: 90 TABLET | Refills: 0 | Status: SHIPPED | OUTPATIENT
Start: 2021-10-27 | End: 2022-01-11 | Stop reason: SDUPTHER

## 2022-08-01 PROBLEM — M25.531 RIGHT WRIST PAIN: Status: RESOLVED | Noted: 2021-04-05 | Resolved: 2022-08-01

## 2022-08-01 PROBLEM — N18.32 STAGE 3B CHRONIC KIDNEY DISEASE: Status: ACTIVE | Noted: 2022-08-01

## 2022-08-01 PROBLEM — G44.89 OTHER HEADACHE SYNDROME: Status: RESOLVED | Noted: 2020-02-11 | Resolved: 2022-08-01

## 2022-08-01 PROBLEM — E66.9 OBESITY (BMI 30-39.9): Status: RESOLVED | Noted: 2018-02-16 | Resolved: 2022-08-01

## 2022-08-01 PROBLEM — H57.89 EYE IRRITATION: Status: RESOLVED | Noted: 2020-09-30 | Resolved: 2022-08-01

## 2022-08-01 PROBLEM — R25.1 TREMOR: Status: RESOLVED | Noted: 2020-06-15 | Resolved: 2022-08-01

## 2022-08-01 PROBLEM — M79.671 RIGHT FOOT PAIN: Status: RESOLVED | Noted: 2017-03-22 | Resolved: 2022-08-01

## 2022-08-01 PROBLEM — F43.9 STRESS AT HOME: Status: RESOLVED | Noted: 2019-06-04 | Resolved: 2022-08-01

## 2022-08-01 PROBLEM — H00.015 HORDEOLUM EXTERNUM OF LEFT LOWER EYELID: Status: RESOLVED | Noted: 2020-06-15 | Resolved: 2022-08-01

## 2023-04-12 NOTE — ASSESSMENT & PLAN NOTE
Patient continues to complain of the neck pain, she feels this is gradually getting worse. Patient takes some ibuprofen about 2 x a week. Helps a bit.  Not exercising.  pjatient states no radiation into th upper extremities.  Not doing any true neck or shoulder x rays.  
Patient continues with dental caries.  She reports she had an abcess about 1 mo ago, antibiotics at the [place] [facility].  Not clear if the patient has medicaid or not.    
Patient not exercising, weight is up.  She has lots of reasons for this.    
Patient tearfully today after discussion of problems and some lack of motivation.   
sestamibi stress test neg 2015.  Patient reports some chest tightness and sob no radiation or specific pain, she was under a lot of stress with tooth abscess.    
English

## 2023-05-12 PROBLEM — G25.0 BENIGN ESSENTIAL TREMOR: Status: ACTIVE | Noted: 2023-05-12

## 2024-02-14 PROBLEM — H93.239 HEARING ABNORMALLY ACUTE: Status: ACTIVE | Noted: 2024-02-14

## (undated) DEVICE — HEMOSTAT SURG ABSORBABLE - 4 X 8 IN SURGICEL (24EA/CA)

## (undated) DEVICE — GLOVE BIOGEL PI INDICATOR SZ 7.0 SURGICAL PF LF - (50/BX 4BX/CA)

## (undated) DEVICE — WATER IRRIG. STER. 1500 ML - (9/CA)

## (undated) DEVICE — PACK NEURO - (2EA/CA)

## (undated) DEVICE — CANISTER SUCTION 3000ML MECHANICAL FILTER AUTO SHUTOFF MEDI-VAC NONSTERILE LF DISP  (40EA/CA)

## (undated) DEVICE — GLOVE BIOGEL PI INDICATOR SZ 8.0 SURGICAL PF LF -(50/BX 4BX/CA)

## (undated) DEVICE — BOVIE BLADE COATED &INSULATED - 25/PK

## (undated) DEVICE — SCREW DISTRACTION 14MM YELLOW - STERILE (10EA/BX) (5TX4=20)

## (undated) DEVICE — KIT ANESTHESIA W/CIRCUIT & 3/LT BAG W/FILTER (20EA/CA)

## (undated) DEVICE — SET LEADWIRE 5 LEAD BEDSIDE DISPOSABLE ECG (1SET OF 5/EA)

## (undated) DEVICE — TUBING C&T SET FLYING LEADS DRAIN TUBING (10EA/BX)

## (undated) DEVICE — ELECTRODE 850 FOAM ADHESIVE - HYDROGEL RADIOTRNSPRNT (50/PK)

## (undated) DEVICE — LACTATED RINGERS INJ. 500 ML - (24EA/CA)

## (undated) DEVICE — PROTECTOR ULNA NERVE - (36PR/CA)

## (undated) DEVICE — KIT SURGIFLO W/OUT THROMBIN - (6EA/CA)

## (undated) DEVICE — RESTRAINTS LIMB DISP. - (12/BX 4BX/CA)

## (undated) DEVICE — SUTURE GENERAL

## (undated) DEVICE — ELECTRODE DUAL RETURN W/ CORD - (50/PK)

## (undated) DEVICE — GLOVE BIOGEL INDICATOR SZ 7.5 SURGICAL PF LTX - (50PR/BX 4BX/CA)

## (undated) DEVICE — DRAPE STRLE REG TOWEL 18X24 - (10/BX 4BX/CA)"

## (undated) DEVICE — SODIUM CHL IRRIGATION 0.9% 1000ML (12EA/CA)

## (undated) DEVICE — MIDAS LUBRICATOR DIFFUSER PACK (4EA/CA)

## (undated) DEVICE — KIT ROOM DECONTAMINATION

## (undated) DEVICE — SUTURE 3-0 VICRYL PLUS RB-1 - 8 X 18 INCH (12/BX)

## (undated) DEVICE — SPONGE GAUZESTER 4 X 4 4PLY - (128PK/CA)

## (undated) DEVICE — DISSECT TOOL MIDAS REX

## (undated) DEVICE — DRESSING TRANSPARENT FILM TEGADERM 4 X 4.75" (50EA/BX)"

## (undated) DEVICE — NEPTUNE 4 PORT MANIFOLD - (20/PK)

## (undated) DEVICE — CHLORAPREP 26 ML APPLICATOR - ORANGE TINT(25/CA)

## (undated) DEVICE — DRAPE MICROSCOPE X-LONG (10EA/CA)

## (undated) DEVICE — GLOVE BIOGEL SZ 7 SURGICAL PF LTX - (50PR/BX 4BX/CA)

## (undated) DEVICE — TUBING CLEARLINK DUO-VENT - C-FLO (48EA/CA)

## (undated) DEVICE — SET EXTENSION WITH 2 PORTS (48EA/CA) ***PART #2C8610 IS A SUBSTITUTE*****

## (undated) DEVICE — DETERGENT RENUZYME PLUS 10 OZ PACKET (50/BX)

## (undated) DEVICE — MASK ANESTHESIA ADULT  - (100/CA)

## (undated) DEVICE — LIGHT SOURCE MIS 12FT

## (undated) DEVICE — SUCTION INSTRUMENT YANKAUER BULBOUS TIP W/O VENT (50EA/CA)

## (undated) DEVICE — NEEDLE NON SAFETY HYPO 22 GA X 1 1/2 IN (100/BX)

## (undated) DEVICE — GOWN WARMING STANDARD FLEX - (30/CA)

## (undated) DEVICE — SYRINGE ASEPTO - (50EA/CA

## (undated) DEVICE — SYRINGE SAFETY 10 ML 18 GA X 1 1/2 BLUNT LL (100/BX 4BX/CA)

## (undated) DEVICE — NEEDLE SPINAL NON-SAFETY 18 GA X 3 IN (25EA/BX)

## (undated) DEVICE — LACTATED RINGERS INJ 1000 ML - (14EA/CA 60CA/PF)

## (undated) DEVICE — GLOVE, BIOGEL ECLIPSE, SZ 7.0, PF LTX (50/BX)

## (undated) DEVICE — SUTURE 4-0 MONOCRYL PLUS PS-2 - 27 INCH (36/BX)

## (undated) DEVICE — SENSOR SPO2 NEO LNCS ADHESIVE (20/BX) SEE USER NOTES

## (undated) DEVICE — SYRINGE SAFETY 5 ML 18 GA X 1-1/2 BLUNT LL (100/BX 4BX/CA)